# Patient Record
Sex: FEMALE | Race: WHITE | NOT HISPANIC OR LATINO | Employment: OTHER | URBAN - METROPOLITAN AREA
[De-identification: names, ages, dates, MRNs, and addresses within clinical notes are randomized per-mention and may not be internally consistent; named-entity substitution may affect disease eponyms.]

---

## 2023-04-05 ENCOUNTER — LAB (OUTPATIENT)
Dept: LAB | Facility: LAB | Age: 73
End: 2023-04-05
Payer: MEDICARE

## 2023-04-05 ENCOUNTER — OFFICE VISIT (OUTPATIENT)
Dept: PRIMARY CARE | Facility: CLINIC | Age: 73
End: 2023-04-05
Payer: MEDICARE

## 2023-04-05 VITALS
HEART RATE: 73 BPM | OXYGEN SATURATION: 98 % | WEIGHT: 179 LBS | TEMPERATURE: 97.5 F | HEIGHT: 62 IN | BODY MASS INDEX: 32.94 KG/M2 | SYSTOLIC BLOOD PRESSURE: 132 MMHG | DIASTOLIC BLOOD PRESSURE: 73 MMHG

## 2023-04-05 DIAGNOSIS — E87.6 HYPOKALEMIA: ICD-10-CM

## 2023-04-05 DIAGNOSIS — S20.212D CONTUSION OF RIB ON LEFT SIDE, SUBSEQUENT ENCOUNTER: ICD-10-CM

## 2023-04-05 DIAGNOSIS — I10 HYPERTENSION, UNSPECIFIED TYPE: Primary | ICD-10-CM

## 2023-04-05 DIAGNOSIS — S92.414A CLOSED NONDISPLACED FRACTURE OF PROXIMAL PHALANX OF RIGHT GREAT TOE, INITIAL ENCOUNTER: ICD-10-CM

## 2023-04-05 DIAGNOSIS — E66.1 CLASS 1 DRUG-INDUCED OBESITY WITH SERIOUS COMORBIDITY AND BODY MASS INDEX (BMI) OF 33.0 TO 33.9 IN ADULT: ICD-10-CM

## 2023-04-05 DIAGNOSIS — R10.9 ABDOMINAL DISCOMFORT: ICD-10-CM

## 2023-04-05 DIAGNOSIS — E78.5 HYPERLIPIDEMIA, UNSPECIFIED HYPERLIPIDEMIA TYPE: ICD-10-CM

## 2023-04-05 DIAGNOSIS — F41.9 ANXIETY: ICD-10-CM

## 2023-04-05 DIAGNOSIS — M19.90 OSTEOARTHRITIS, UNSPECIFIED OSTEOARTHRITIS TYPE, UNSPECIFIED SITE: ICD-10-CM

## 2023-04-05 DIAGNOSIS — I10 HYPERTENSION, ESSENTIAL: ICD-10-CM

## 2023-04-05 DIAGNOSIS — E55.9 VITAMIN D DEFICIENCY: ICD-10-CM

## 2023-04-05 DIAGNOSIS — F39 MOOD DISORDER (CMS-HCC): ICD-10-CM

## 2023-04-05 DIAGNOSIS — R10.9 ABDOMINAL PAIN, UNSPECIFIED ABDOMINAL LOCATION: ICD-10-CM

## 2023-04-05 DIAGNOSIS — Z79.899 MEDICATION MANAGEMENT: ICD-10-CM

## 2023-04-05 PROBLEM — M25.571 ANKLE PAIN, RIGHT: Status: ACTIVE | Noted: 2023-04-05

## 2023-04-05 PROBLEM — U07.1 COVID-19 VIRUS INFECTION: Status: RESOLVED | Noted: 2023-04-05 | Resolved: 2023-04-05

## 2023-04-05 PROBLEM — K44.9 HIATAL HERNIA: Status: ACTIVE | Noted: 2023-04-05

## 2023-04-05 PROBLEM — U07.1 COVID-19 VIRUS INFECTION: Status: ACTIVE | Noted: 2023-04-05

## 2023-04-05 PROBLEM — E78.2 MIXED HYPERLIPIDEMIA: Status: ACTIVE | Noted: 2023-04-05

## 2023-04-05 PROBLEM — H69.90 ETD (EUSTACHIAN TUBE DYSFUNCTION): Status: ACTIVE | Noted: 2023-04-05

## 2023-04-05 PROBLEM — R23.2 HOT FLASHES: Status: ACTIVE | Noted: 2023-04-05

## 2023-04-05 PROBLEM — L60.8 CHANGE IN NAIL APPEARANCE: Status: RESOLVED | Noted: 2023-04-05 | Resolved: 2023-04-05

## 2023-04-05 PROBLEM — L60.8 CHANGE IN NAIL APPEARANCE: Status: ACTIVE | Noted: 2023-04-05

## 2023-04-05 PROBLEM — R41.3 COMPLAINT OF MEMORY DISORDER WITHOUT OBSERVED OBJECTIVE MEMORY DEFICIT: Status: ACTIVE | Noted: 2023-04-05

## 2023-04-05 PROBLEM — R39.9 UTI SYMPTOMS: Status: ACTIVE | Noted: 2023-04-05

## 2023-04-05 PROBLEM — R73.9 ELEVATED BLOOD SUGAR: Status: ACTIVE | Noted: 2023-04-05

## 2023-04-05 PROBLEM — H81.10 BPV (BENIGN POSITIONAL VERTIGO): Status: ACTIVE | Noted: 2023-04-05

## 2023-04-05 PROBLEM — R05.9 COUGH: Status: ACTIVE | Noted: 2023-04-05

## 2023-04-05 PROBLEM — M79.676 TOE PAIN: Status: ACTIVE | Noted: 2023-04-05

## 2023-04-05 PROBLEM — R31.9 HEMATURIA: Status: ACTIVE | Noted: 2023-04-05

## 2023-04-05 PROBLEM — K21.9 GERD (GASTROESOPHAGEAL REFLUX DISEASE): Status: ACTIVE | Noted: 2023-04-05

## 2023-04-05 PROBLEM — J45.909 REACTIVE AIRWAY DISEASE (HHS-HCC): Status: ACTIVE | Noted: 2023-04-05

## 2023-04-05 PROBLEM — M89.9 DISORDER OF RIB: Status: ACTIVE | Noted: 2023-04-05

## 2023-04-05 PROBLEM — G89.29 CHRONIC BILATERAL LOW BACK PAIN WITHOUT SCIATICA: Status: ACTIVE | Noted: 2023-04-05

## 2023-04-05 PROBLEM — R05.9 COUGH: Status: RESOLVED | Noted: 2023-04-05 | Resolved: 2023-04-05

## 2023-04-05 PROBLEM — R39.9 UTI SYMPTOMS: Status: RESOLVED | Noted: 2023-04-05 | Resolved: 2023-04-05

## 2023-04-05 PROBLEM — J02.9 SORE THROAT: Status: RESOLVED | Noted: 2023-04-05 | Resolved: 2023-04-05

## 2023-04-05 PROBLEM — G62.9 NEUROPATHY: Status: ACTIVE | Noted: 2023-04-05

## 2023-04-05 PROBLEM — J30.9 ALLERGIC RHINITIS: Status: ACTIVE | Noted: 2023-04-05

## 2023-04-05 PROBLEM — G47.33 OBSTRUCTIVE SLEEP APNEA: Status: ACTIVE | Noted: 2023-04-05

## 2023-04-05 PROBLEM — B36.9 FUNGAL DERMATITIS: Status: RESOLVED | Noted: 2023-04-05 | Resolved: 2023-04-05

## 2023-04-05 PROBLEM — M79.671 RIGHT FOOT PAIN: Status: ACTIVE | Noted: 2023-04-05

## 2023-04-05 PROBLEM — R41.3 IMPAIRED MEMORY: Status: ACTIVE | Noted: 2023-04-05

## 2023-04-05 PROBLEM — M54.50 CHRONIC BILATERAL LOW BACK PAIN WITHOUT SCIATICA: Status: ACTIVE | Noted: 2023-04-05

## 2023-04-05 PROBLEM — G47.419 NARCOLEPSY WITHOUT CATAPLEXY (HHS-HCC): Status: ACTIVE | Noted: 2023-04-05

## 2023-04-05 PROBLEM — R53.83 FATIGUE: Status: ACTIVE | Noted: 2023-04-05

## 2023-04-05 PROBLEM — J02.9 SORE THROAT: Status: ACTIVE | Noted: 2023-04-05

## 2023-04-05 PROBLEM — B36.9 FUNGAL DERMATITIS: Status: ACTIVE | Noted: 2023-04-05

## 2023-04-05 PROBLEM — M26.649 TMJ ARTHRITIS: Status: ACTIVE | Noted: 2023-04-05

## 2023-04-05 LAB — URATE (MG/DL) IN SER/PLAS: 3.8 MG/DL (ref 2.3–6.7)

## 2023-04-05 PROCEDURE — 36415 COLL VENOUS BLD VENIPUNCTURE: CPT

## 2023-04-05 PROCEDURE — 80358 DRUG SCREENING METHADONE: CPT

## 2023-04-05 PROCEDURE — 3078F DIAST BP <80 MM HG: CPT | Performed by: INTERNAL MEDICINE

## 2023-04-05 PROCEDURE — 1036F TOBACCO NON-USER: CPT | Performed by: INTERNAL MEDICINE

## 2023-04-05 PROCEDURE — 80361 OPIATES 1 OR MORE: CPT

## 2023-04-05 PROCEDURE — 1159F MED LIST DOCD IN RCRD: CPT | Performed by: INTERNAL MEDICINE

## 2023-04-05 PROCEDURE — 80365 DRUG SCREENING OXYCODONE: CPT

## 2023-04-05 PROCEDURE — 80346 BENZODIAZEPINES1-12: CPT

## 2023-04-05 PROCEDURE — 84550 ASSAY OF BLOOD/URIC ACID: CPT

## 2023-04-05 PROCEDURE — 3008F BODY MASS INDEX DOCD: CPT | Performed by: INTERNAL MEDICINE

## 2023-04-05 PROCEDURE — 99214 OFFICE O/P EST MOD 30 MIN: CPT | Performed by: INTERNAL MEDICINE

## 2023-04-05 PROCEDURE — 80354 DRUG SCREENING FENTANYL: CPT

## 2023-04-05 PROCEDURE — 3075F SYST BP GE 130 - 139MM HG: CPT | Performed by: INTERNAL MEDICINE

## 2023-04-05 PROCEDURE — 80373 DRUG SCREENING TRAMADOL: CPT

## 2023-04-05 PROCEDURE — 80368 SEDATIVE HYPNOTICS: CPT

## 2023-04-05 PROCEDURE — 80307 DRUG TEST PRSMV CHEM ANLYZR: CPT

## 2023-04-05 RX ORDER — GLUCOSAM/CHONDRO/HERB 149/HYAL 750-100 MG
1 TABLET ORAL DAILY
COMMUNITY

## 2023-04-05 RX ORDER — BUSPIRONE HYDROCHLORIDE 10 MG/1
5 TABLET ORAL DAILY
COMMUNITY
Start: 2021-10-15 | End: 2023-09-01 | Stop reason: ALTCHOICE

## 2023-04-05 RX ORDER — CHLORHEXIDINE GLUCONATE ORAL RINSE 1.2 MG/ML
SOLUTION DENTAL
COMMUNITY

## 2023-04-05 RX ORDER — POLYETHYLENE GLYCOL 3350 17 G/17G
POWDER, FOR SOLUTION ORAL AS NEEDED
COMMUNITY

## 2023-04-05 RX ORDER — MULTIVIT-MIN/IRON/FOLIC ACID/K 18-600-40
1 CAPSULE ORAL DAILY
COMMUNITY
Start: 2018-08-17

## 2023-04-05 RX ORDER — TURMERIC ROOT EXTRACT 500 MG
1 TABLET ORAL DAILY
COMMUNITY

## 2023-04-05 RX ORDER — BIOTIN 1 MG
TABLET ORAL
COMMUNITY

## 2023-04-05 RX ORDER — OMEPRAZOLE 20 MG/1
20 CAPSULE, DELAYED RELEASE ORAL DAILY
COMMUNITY
Start: 2020-02-21 | End: 2023-09-05 | Stop reason: SDUPTHER

## 2023-04-05 RX ORDER — DULOXETIN HYDROCHLORIDE 30 MG/1
1 CAPSULE, DELAYED RELEASE ORAL DAILY
COMMUNITY
Start: 2018-08-17 | End: 2023-08-11 | Stop reason: SDUPTHER

## 2023-04-05 RX ORDER — PRAVASTATIN SODIUM 20 MG/1
1 TABLET ORAL NIGHTLY
COMMUNITY
Start: 2018-08-17 | End: 2023-08-11 | Stop reason: SDUPTHER

## 2023-04-05 RX ORDER — ALPRAZOLAM 0.25 MG/1
0.25 TABLET ORAL 2 TIMES DAILY PRN
Qty: 60 TABLET | Refills: 1 | Status: SHIPPED | OUTPATIENT
Start: 2023-04-05 | End: 2023-09-01 | Stop reason: SDUPTHER

## 2023-04-05 RX ORDER — AMOXICILLIN AND CLAVULANATE POTASSIUM 875; 125 MG/1; MG/1
875 TABLET, FILM COATED ORAL 2 TIMES DAILY
Qty: 20 TABLET | Refills: 0 | Status: SHIPPED | OUTPATIENT
Start: 2023-04-05 | End: 2023-04-15

## 2023-04-05 RX ORDER — LOSARTAN POTASSIUM 100 MG/1
1 TABLET ORAL DAILY
COMMUNITY
Start: 2018-07-30 | End: 2023-09-05 | Stop reason: SDUPTHER

## 2023-04-05 RX ORDER — HYDROCHLOROTHIAZIDE 12.5 MG/1
1 CAPSULE ORAL DAILY
COMMUNITY
Start: 2018-08-17 | End: 2023-06-19 | Stop reason: SDUPTHER

## 2023-04-05 RX ORDER — ALBUTEROL SULFATE 90 UG/1
2 AEROSOL, METERED RESPIRATORY (INHALATION) EVERY 4 HOURS PRN
COMMUNITY
Start: 2018-08-17

## 2023-04-05 RX ORDER — FLUTICASONE PROPIONATE 50 MCG
2 SPRAY, SUSPENSION (ML) NASAL DAILY
COMMUNITY
Start: 2018-08-17 | End: 2023-09-25 | Stop reason: SDUPTHER

## 2023-04-05 RX ORDER — SUMATRIPTAN 50 MG/1
TABLET, FILM COATED ORAL
COMMUNITY
Start: 2018-08-17 | End: 2023-06-30 | Stop reason: SDUPTHER

## 2023-04-05 RX ORDER — ALPRAZOLAM 0.25 MG/1
0.25 TABLET ORAL 2 TIMES DAILY PRN
COMMUNITY
Start: 2022-01-31 | End: 2023-04-05 | Stop reason: SDUPTHER

## 2023-04-05 RX ORDER — FERROUS SULFATE, DRIED 160(50) MG
2 TABLET, EXTENDED RELEASE ORAL DAILY
COMMUNITY

## 2023-04-05 RX ORDER — AMLODIPINE BESYLATE 2.5 MG/1
1 TABLET ORAL DAILY
COMMUNITY
Start: 2021-10-29 | End: 2023-10-25 | Stop reason: SDUPTHER

## 2023-04-05 RX ORDER — GABAPENTIN 100 MG/1
2 CAPSULE ORAL SEE ADMIN INSTRUCTIONS
COMMUNITY
Start: 2018-08-17 | End: 2023-09-05 | Stop reason: SDUPTHER

## 2023-04-05 RX ORDER — LORATADINE 10 MG/1
1 TABLET ORAL DAILY PRN
COMMUNITY

## 2023-04-05 RX ORDER — PERPHENAZINE/AMITRIPTYLINE HCL 2 MG-10 MG
1 TABLET ORAL DAILY
COMMUNITY
Start: 2018-08-17

## 2023-04-05 ASSESSMENT — PATIENT HEALTH QUESTIONNAIRE - PHQ9
SUM OF ALL RESPONSES TO PHQ9 QUESTIONS 1 AND 2: 1
10. IF YOU CHECKED OFF ANY PROBLEMS, HOW DIFFICULT HAVE THESE PROBLEMS MADE IT FOR YOU TO DO YOUR WORK, TAKE CARE OF THINGS AT HOME, OR GET ALONG WITH OTHER PEOPLE: NOT DIFFICULT AT ALL
2. FEELING DOWN, DEPRESSED OR HOPELESS: SEVERAL DAYS
10. IF YOU CHECKED OFF ANY PROBLEMS, HOW DIFFICULT HAVE THESE PROBLEMS MADE IT FOR YOU TO DO YOUR WORK, TAKE CARE OF THINGS AT HOME, OR GET ALONG WITH OTHER PEOPLE: NOT DIFFICULT AT ALL
SUM OF ALL RESPONSES TO PHQ9 QUESTIONS 1 AND 2: 1
2. FEELING DOWN, DEPRESSED OR HOPELESS: SEVERAL DAYS
1. LITTLE INTEREST OR PLEASURE IN DOING THINGS: NOT AT ALL
1. LITTLE INTEREST OR PLEASURE IN DOING THINGS: NOT AT ALL

## 2023-04-05 ASSESSMENT — ENCOUNTER SYMPTOMS
LOSS OF SENSATION IN FEET: 0
DEPRESSION: 0
OCCASIONAL FEELINGS OF UNSTEADINESS: 0

## 2023-04-05 NOTE — PROGRESS NOTES
ASSESSMENT/PLAN  Problem List Items Addressed This Visit          Nervous    Abdominal discomfort       Circulatory    Hypertension, essential    Overview     2/17/21: Continue current medications.            Musculoskeletal    Osteoarthritis       Endocrine/Metabolic    Vitamin D deficiency       Other    Anxiety    Relevant Medications    ALPRAZolam (Xanax) 0.25 mg tablet    Hypokalemia    Mood disorder (CMS/HCC)    Overview     Stable condition. Follow up at least yearly. Continue current medications.  2/1/22: inc buspar.          Other Visit Diagnoses       Hypertension, unspecified type    -  Primary    Hyperlipidemia, unspecified hyperlipidemia type        Class 1 drug-induced obesity with serious comorbidity and body mass index (BMI) of 33.0 to 33.9 in adult        stable cont meds fu at least yearly    Closed nondisplaced fracture of proximal phalanx of right great toe, initial encounter        Relevant Medications    amoxicillin-pot clavulanate (Augmentin) 875-125 mg tablet    Other Relevant Orders    XR toes right 2+ views    Uric acid    Abdominal pain, unspecified abdominal location        Relevant Orders    US abdomen    Contusion of rib on left side, subsequent encounter        Relevant Orders    XR ribs left 2 views              Chief Complaint/HPI: 6 month follow up  Toe pain  Fell 3 weeks ago   Hit head, broke toe  Knee and r elbow  Left hand      ROS otherwise negative aside from what was mentioned above in HPI.  Gen:  no fever  HEENT:  no trouble swallowing  CV:  no dyspnea, cyanosis  Lungs:  no shortness of breath  GI:  no constipation, no blood in stool  Vascular:  no edema  Neuro:   no weakness  Skin:  no rash  MS: pos  joint swelling  Gu:  no urinary complaints  All other systems have been reviewed and are negative for complaint      Patient Active Problem List   Diagnosis    Abdominal discomfort    Allergic rhinitis    Ankle pain, right    Anxiety    BPV (benign positional vertigo)    Chronic  bilateral low back pain without sciatica    Complaint of memory disorder without observed objective memory deficit    Fatigue    Disorder of rib    Elevated blood sugar    Hypertension, essential    ETD (eustachian tube dysfunction)    GERD (gastroesophageal reflux disease)    Hiatal hernia    Hematuria    Hot flashes    Hypokalemia    Impaired memory    Mixed hyperlipidemia    Mood disorder (CMS/HCC)    Narcolepsy without cataplexy    Neuropathy    Osteoarthritis    Obstructive sleep apnea    Reactive airway disease    Right foot pain    Toe pain    TMJ arthritis    Vitamin D deficiency       ALLERGIES  Atorvastatin, Pregabalin, and Statins-hmg-coa reductase inhibitors    MEDICATIONS  Current Outpatient Medications   Medication Instructions    albuterol 90 mcg/actuation inhaler 2 puffs, inhalation, Every 4 hours PRN    ALPRAZolam (XANAX) 0.25 mg, oral, 2 times daily PRN    amLODIPine (Norvasc) 2.5 mg tablet 1 tablet, oral, Daily    amoxicillin-pot clavulanate (Augmentin) 875-125 mg tablet 875 mg, oral, 2 times daily    ascorbic acid, vitamin C, 500 mg capsule 1 capsule, oral, Daily    biotin 1 mg tablet Take as directed    busPIRone (BUSPAR) 10 mg, oral, 2 times daily    calcium carbonate-vitamin D3 500 mg-5 mcg (200 unit) tablet 2 tablets, oral, Daily    chlorhexidine (Peridex) 0.12 % solution RINSE MOUTH WITH 15 ML (1 CAPFUL) FOR 30 SECONDS AM AND PM AFTER TOOTHBRUSHING. EXPECTORATE AFTER RINSING. DO NOT SWALLOW PRN.    cranberry conc-ascorbic acid 12,600-20 mg capsule 1 capsule, oral, Daily    DULoxetine (Cymbalta) 30 mg DR capsule 1 capsule, oral, Daily    fluticasone (Flonase) 50 mcg/actuation nasal spray 2 sprays, Each Nostril, Daily    gabapentin (Neurontin) 100 mg capsule 2 capsules, oral, 2 times daily    hydroCHLOROthiazide (Microzide) 12.5 mg capsule 1 capsule, oral, Daily    L. acidophilus/Bifid. animalis (DAILY PROBIOTIC ORAL) Use as directed    loratadine (Claritin) 10 mg tablet 1 tablet, oral, Daily  "PRN    losartan (Cozaar) 100 mg tablet 1 tablet, oral, Daily    NON FORMULARY Vitamin D 50 MCG (2000 UT) Oral Capsule; TAKE 2 CAPSULES Daily    omega 3-dha-epa-fish oil 1,000 mg (120 mg-180 mg) capsule 1 capsule, oral, Daily    omeprazole (PRILOSEC) 20 mg, oral, 2 times daily    parsley/garlic (GARLIC-PARSLEY ORAL) Take as directed    polyethylene glycol (Glycolax) 17 gram/dose powder MIX 1 PACKET IN 8 OUNCES OF LIQUID AND DRINK EOD    pravastatin (Pravachol) 20 mg tablet 1 tablet, oral, Nightly    SUMAtriptan (Imitrex) 50 mg tablet TAKE 1 TABLET AT ONSET OF HEADACHE. CAN TAKE ANOTHER DOSE IN 2 HOURS IF HEADACHE CONTINUES.    turmeric root extract 500 mg tablet 1 tablet, oral, Daily        PHYSICAL EXAM  /73   Pulse 73   Temp 36.4 °C (97.5 °F)   Ht 1.562 m (5' 1.5\")   Wt 81.2 kg (179 lb)   SpO2 98%   BMI 33.27 kg/m²   Body mass index is 33.27 kg/m².    General Appearance:  Alert and oriented.  NAD  Lungs, CTAB  Skin:  no suspicious lesions,  warm and dry  Head :  Normocephalic  Neck/thyroid:  neck supple, full rom, no cervical lymphadenopathy  no thyromegaly  Heart:  RRR  no murmurs  Abdomen:  Normal , bs present, soft, tender over epigastric area and lower rib, not distended, no masses palpated  Extremities:  No clubbing, cyanosis, or edema  Neurologic:  Nonfocal  Psych: alert, normal mood  R great toe mild redness, swelling  Broken bv in left hand    Aileen was seen today for follow-up.  Diagnoses and all orders for this visit:  Hypertension, unspecified type (Primary)  Vitamin D deficiency  Hypokalemia  Mood disorder (CMS/HCC)  Comments:  stable cont meds     fu at least yearly  Hyperlipidemia, unspecified hyperlipidemia type  Class 1 drug-induced obesity with serious comorbidity and body mass index (BMI) of 33.0 to 33.9 in adult  Comments:  stable cont meds fu at least yearly  Closed nondisplaced fracture of proximal phalanx of right great toe, initial encounter  -     XR toes right 2+ views; " Future  -     Uric acid; Future  -     amoxicillin-pot clavulanate (Augmentin) 875-125 mg tablet; Take 1 tablet (875 mg) by mouth in the morning and 1 tablet (875 mg) before bedtime. Do all this for 10 days.  Abdominal pain, unspecified abdominal location  -     US abdomen; Future  Contusion of rib on left side, subsequent encounter  -     XR ribs left 2 views; Future  Anxiety  -     ALPRAZolam (Xanax) 0.25 mg tablet; Take 1 tablet (0.25 mg) by mouth 2 times a day as needed for anxiety.  Osteoarthritis, unspecified osteoarthritis type, unspecified site  Hypertension, essential  Abdominal discomfort    Chronic conditions reviewed in the assessment and plan.    Continue medications unless specified otherwise.  Previous labs reviewed. Ccf  lipids done as well   Ldl 108  Other specialty provider notes reviewed.

## 2023-04-13 LAB
6-ACETYLMORPHINE: <25 NG/ML
7-AMINOCLONAZEPAM: <25 NG/ML
ALPHA-HYDROXYALPRAZOLAM: 37 NG/ML
ALPHA-HYDROXYMIDAZOLAM: <25 NG/ML
ALPRAZOLAM: <25 NG/ML
AMPHETAMINE (PRESENCE) IN URINE BY SCREEN METHOD: ABNORMAL
BARBITURATES PRESENCE IN URINE BY SCREEN METHOD: ABNORMAL
CANNABINOIDS IN URINE BY SCREEN METHOD: ABNORMAL
CHLORDIAZEPOXIDE: <25 NG/ML
CLONAZEPAM: <25 NG/ML
COCAINE (PRESENCE) IN URINE BY SCREEN METHOD: ABNORMAL
CODEINE: <50 NG/ML
CREATINE, URINE FOR DRUG: 38 MG/DL
DIAZEPAM: <25 NG/ML
DRUG SCREEN COMMENT URINE: ABNORMAL
EDDP: <25 NG/ML
FENTANYL CONFIRMATION, URINE: <2.5 NG/ML
HYDROCODONE: <25 NG/ML
HYDROMORPHONE: <25 NG/ML
LORAZEPAM: <25 NG/ML
METHADONE CONFIRMATION,URINE: <25 NG/ML
MIDAZOLAM: <25 NG/ML
MORPHINE URINE: <50 NG/ML
NORDIAZEPAM: <25 NG/ML
NORFENTANYL: <2.5 NG/ML
NORHYDROCODONE: <25 NG/ML
NOROXYCODONE: <25 NG/ML
O-DESMETHYLTRAMADOL: <50 NG/ML
OXAZEPAM: <25 NG/ML
OXYCODONE: <25 NG/ML
OXYMORPHONE: <25 NG/ML
PHENCYCLIDINE (PRESENCE) IN URINE BY SCREEN METHOD: ABNORMAL
TEMAZEPAM: <25 NG/ML
TRAMADOL: <50 NG/ML
ZOLPIDEM METABOLITE (ZCA): <25 NG/ML
ZOLPIDEM: <25 NG/ML

## 2023-06-19 DIAGNOSIS — I10 HYPERTENSION, ESSENTIAL: ICD-10-CM

## 2023-06-20 RX ORDER — HYDROCHLOROTHIAZIDE 12.5 MG/1
12.5 CAPSULE ORAL DAILY
Qty: 90 CAPSULE | Refills: 3 | Status: SHIPPED | OUTPATIENT
Start: 2023-06-20 | End: 2024-03-06 | Stop reason: DRUGHIGH

## 2023-06-30 DIAGNOSIS — G43.809 OTHER MIGRAINE WITHOUT STATUS MIGRAINOSUS, NOT INTRACTABLE: ICD-10-CM

## 2023-06-30 RX ORDER — SUMATRIPTAN 50 MG/1
50 TABLET, FILM COATED ORAL ONCE AS NEEDED
Qty: 9 TABLET | Refills: 3 | Status: SHIPPED | OUTPATIENT
Start: 2023-06-30 | End: 2024-04-05

## 2023-08-11 DIAGNOSIS — E78.2 MIXED HYPERLIPIDEMIA: ICD-10-CM

## 2023-08-11 DIAGNOSIS — F39 MOOD DISORDER (CMS-HCC): ICD-10-CM

## 2023-08-11 RX ORDER — PRAVASTATIN SODIUM 20 MG/1
20 TABLET ORAL NIGHTLY
Qty: 90 TABLET | Refills: 3 | Status: SHIPPED | OUTPATIENT
Start: 2023-08-11 | End: 2024-01-29 | Stop reason: SDUPTHER

## 2023-08-11 RX ORDER — DULOXETIN HYDROCHLORIDE 30 MG/1
30 CAPSULE, DELAYED RELEASE ORAL DAILY
Qty: 90 CAPSULE | Refills: 3 | Status: SHIPPED | OUTPATIENT
Start: 2023-08-11 | End: 2024-01-29 | Stop reason: SDUPTHER

## 2023-09-01 ENCOUNTER — OFFICE VISIT (OUTPATIENT)
Dept: PRIMARY CARE | Facility: CLINIC | Age: 73
End: 2023-09-01
Payer: MEDICARE

## 2023-09-01 VITALS
TEMPERATURE: 97.3 F | SYSTOLIC BLOOD PRESSURE: 130 MMHG | HEART RATE: 80 BPM | WEIGHT: 182 LBS | DIASTOLIC BLOOD PRESSURE: 80 MMHG | BODY MASS INDEX: 35.73 KG/M2 | HEIGHT: 60 IN | OXYGEN SATURATION: 97 %

## 2023-09-01 DIAGNOSIS — I10 HYPERTENSION, ESSENTIAL: ICD-10-CM

## 2023-09-01 DIAGNOSIS — G44.009 CLUSTER HEADACHE, NOT INTRACTABLE, UNSPECIFIED CHRONICITY PATTERN: ICD-10-CM

## 2023-09-01 DIAGNOSIS — G43.809 OTHER MIGRAINE WITHOUT STATUS MIGRAINOSUS, NOT INTRACTABLE: ICD-10-CM

## 2023-09-01 DIAGNOSIS — E55.9 VITAMIN D DEFICIENCY: ICD-10-CM

## 2023-09-01 DIAGNOSIS — F41.9 ANXIETY: ICD-10-CM

## 2023-09-01 DIAGNOSIS — Z00.00 WELL ADULT EXAM: Primary | ICD-10-CM

## 2023-09-01 DIAGNOSIS — F39 MOOD DISORDER (CMS-HCC): ICD-10-CM

## 2023-09-01 DIAGNOSIS — E66.01 CLASS 2 SEVERE OBESITY DUE TO EXCESS CALORIES WITH SERIOUS COMORBIDITY AND BODY MASS INDEX (BMI) OF 35.0 TO 35.9 IN ADULT (MULTI): ICD-10-CM

## 2023-09-01 DIAGNOSIS — K21.9 GASTROESOPHAGEAL REFLUX DISEASE WITHOUT ESOPHAGITIS: ICD-10-CM

## 2023-09-01 DIAGNOSIS — R73.9 ELEVATED BLOOD SUGAR: ICD-10-CM

## 2023-09-01 DIAGNOSIS — E78.2 MIXED HYPERLIPIDEMIA: ICD-10-CM

## 2023-09-01 PROBLEM — M79.676 TOE PAIN: Status: RESOLVED | Noted: 2023-04-05 | Resolved: 2023-09-01

## 2023-09-01 PROBLEM — M89.9 DISORDER OF RIB: Status: RESOLVED | Noted: 2023-04-05 | Resolved: 2023-09-01

## 2023-09-01 PROBLEM — M79.671 RIGHT FOOT PAIN: Status: RESOLVED | Noted: 2023-04-05 | Resolved: 2023-09-01

## 2023-09-01 PROCEDURE — 99214 OFFICE O/P EST MOD 30 MIN: CPT | Performed by: INTERNAL MEDICINE

## 2023-09-01 PROCEDURE — 3079F DIAST BP 80-89 MM HG: CPT | Performed by: INTERNAL MEDICINE

## 2023-09-01 PROCEDURE — G0439 PPPS, SUBSEQ VISIT: HCPCS | Performed by: INTERNAL MEDICINE

## 2023-09-01 PROCEDURE — 1159F MED LIST DOCD IN RCRD: CPT | Performed by: INTERNAL MEDICINE

## 2023-09-01 PROCEDURE — 93000 ELECTROCARDIOGRAM COMPLETE: CPT | Performed by: INTERNAL MEDICINE

## 2023-09-01 PROCEDURE — 1036F TOBACCO NON-USER: CPT | Performed by: INTERNAL MEDICINE

## 2023-09-01 PROCEDURE — 3008F BODY MASS INDEX DOCD: CPT | Performed by: INTERNAL MEDICINE

## 2023-09-01 PROCEDURE — 3075F SYST BP GE 130 - 139MM HG: CPT | Performed by: INTERNAL MEDICINE

## 2023-09-01 PROCEDURE — 1160F RVW MEDS BY RX/DR IN RCRD: CPT | Performed by: INTERNAL MEDICINE

## 2023-09-01 RX ORDER — ALPRAZOLAM 0.25 MG/1
0.25 TABLET ORAL 2 TIMES DAILY PRN
Qty: 60 TABLET | Refills: 2 | Status: SHIPPED | OUTPATIENT
Start: 2023-09-01 | End: 2023-12-06 | Stop reason: SDUPTHER

## 2023-09-01 RX ORDER — CELECOXIB 100 MG/1
100 CAPSULE ORAL AS NEEDED
COMMUNITY

## 2023-09-01 ASSESSMENT — ANXIETY QUESTIONNAIRES
IF YOU CHECKED OFF ANY PROBLEMS ON THIS QUESTIONNAIRE, HOW DIFFICULT HAVE THESE PROBLEMS MADE IT FOR YOU TO DO YOUR WORK, TAKE CARE OF THINGS AT HOME, OR GET ALONG WITH OTHER PEOPLE: NOT DIFFICULT AT ALL
6. BECOMING EASILY ANNOYED OR IRRITABLE: SEVERAL DAYS
3. WORRYING TOO MUCH ABOUT DIFFERENT THINGS: NEARLY EVERY DAY
5. BEING SO RESTLESS THAT IT IS HARD TO SIT STILL: NOT AT ALL
2. NOT BEING ABLE TO STOP OR CONTROL WORRYING: SEVERAL DAYS
1. FEELING NERVOUS, ANXIOUS, OR ON EDGE: NEARLY EVERY DAY
7. FEELING AFRAID AS IF SOMETHING AWFUL MIGHT HAPPEN: NOT AT ALL
GAD7 TOTAL SCORE: 8
4. TROUBLE RELAXING: NOT AT ALL

## 2023-09-01 ASSESSMENT — PATIENT HEALTH QUESTIONNAIRE - PHQ9
2. FEELING DOWN, DEPRESSED OR HOPELESS: NOT AT ALL
1. LITTLE INTEREST OR PLEASURE IN DOING THINGS: NOT AT ALL
SUM OF ALL RESPONSES TO PHQ9 QUESTIONS 1 AND 2: 0

## 2023-09-01 NOTE — PROGRESS NOTES
Chief Complaint:   Medicare Wellness Exam/Comprehensive Problem Focused Follow Up and Physical Exam    HPI:  Anxiety up    No cp no sob   with cancer  Mood off a couple months ago  Dec to 5 mg of buspar felt better  Weaned buspar  To one per day  Ortho dr ho concepcion for elbow  R toe healed, but not perfect  Does pt  Hemp cream elbow  Broken toe  Still swolllen   Did not see ortho  Cluster ha  Done  Imitrex helped  Fell and broke toe in sej[place]          Patient Care Team:  Luz Maria Allen MD as PCP - General  [facility]uz [facility]   Active Problem List  Patient Active Problem List   Diagnosis    Abdominal discomfort    Allergic rhinitis    Ankle pain, right    Anxiety    BPV (benign positional vertigo)    Chronic bilateral low back pain without sciatica    Complaint of memory disorder without observed objective memory deficit    Fatigue    Elevated blood sugar    Hypertension, essential    ETD (eustachian tube dysfunction)    GERD (gastroesophageal reflux disease)    Hiatal hernia    Hematuria    Hot flashes    Hypokalemia    Impaired memory    Mixed hyperlipidemia    Mood disorder (CMS/HCC)    Narcolepsy without cataplexy    Neuropathy    Osteoarthritis    Obstructive sleep apnea    Reactive airway disease    TMJ arthritis    Vitamin D deficiency         Comprehensive Medical/Surgical/Social/Family History  Past Medical History:   Diagnosis Date    Body mass index (BMI) 35.0-35.9, adult 10/15/2020    Body mass index (BMI) of 35.0 to 35.9 in adult    Migraine, unspecified, not intractable, without status migrainosus     Migraine    Other postherpetic nervous system involvement     PHN (postherpetic neuralgia)    Other symptoms and signs involving the nervous system 10/14/2019    Suspected sleep apnea    Personal history of other diseases of the circulatory system     History of varicose veins of lower extremity    Personal history of other diseases of the  circulatory system     History of hypertension    Personal history of other diseases of the digestive system     History of gastroesophageal reflux (GERD)    Personal history of other diseases of the musculoskeletal system and connective tissue     History of fibromyalgia    Personal history of other diseases of the musculoskeletal system and connective tissue     History of osteopenia    Personal history of other diseases of the respiratory system     History of asthma    Personal history of other endocrine, nutritional and metabolic disease     History of hyperlipidemia    Personal history of other mental and behavioral disorders     History of depression     Past Surgical History:   Procedure Laterality Date    CHOLECYSTECTOMY  08/17/2018    Cholecystectomy    HERNIA REPAIR  08/17/2018    Inguinal Hernia Repair    HYSTERECTOMY  08/17/2018    Hysterectomy    OTHER SURGICAL HISTORY  08/17/2018    Hysteroscopy With Resection For Intrauterine Polyp Removal    OTHER SURGICAL HISTORY  11/17/2020    Hernia repair    OTHER SURGICAL HISTORY  11/17/2020    Lower back surgery    OTHER SURGICAL HISTORY  10/11/2019    Colonoscopy     Social History     Social History Narrative    Not on file     Tobacco/Alcohol/Opioid use, as well as Illicit Drug Use was screened for/reviewed and documented in Social Documentation section of the chart and medication list as appropriate    Allergies and Medications  Atorvastatin, Pregabalin, and Statins-hmg-coa reductase inhibitors  Current Outpatient Medications   Medication Instructions    albuterol 90 mcg/actuation inhaler 2 puffs, inhalation, Every 4 hours PRN    ALPRAZolam (XANAX) 0.25 mg, oral, 2 times daily PRN    amLODIPine (Norvasc) 2.5 mg tablet 1 tablet, oral, Daily    ascorbic acid, vitamin C, 500 mg capsule 1 capsule, oral, Daily    biotin 1 mg tablet Take as directed    calcium carbonate-vitamin D3 500 mg-5 mcg (200 unit) tablet 2 tablets, oral, Daily    celecoxib (CELEBREX) 100  mg, oral, Daily    chlorhexidine (Peridex) 0.12 % solution RINSE MOUTH WITH 15 ML (1 CAPFUL) FOR 30 SECONDS AM AND PM AFTER TOOTHBRUSHING. EXPECTORATE AFTER RINSING. DO NOT SWALLOW PRN.    cranberry conc-ascorbic acid 12,600-20 mg capsule 1 capsule, oral, Daily    DULoxetine (CYMBALTA) 30 mg, oral, Daily    fluticasone (Flonase) 50 mcg/actuation nasal spray 2 sprays, Each Nostril, Daily    gabapentin (Neurontin) 100 mg capsule 2 capsules, oral, See admin instructions, 2 capsule in am and 1 capsule in pm    hydroCHLOROthiazide (MICROZIDE) 12.5 mg, oral, Daily    L. acidophilus/Bifid. animalis (DAILY PROBIOTIC ORAL) Use as directed    loratadine (Claritin) 10 mg tablet 1 tablet, oral, Daily PRN    losartan (Cozaar) 100 mg tablet 1 tablet, oral, Daily    NON FORMULARY Vitamin D 50 MCG (2000 UT) Oral Capsule; TAKE 2 CAPSULES Daily    omega 3-dha-epa-fish oil 1,000 mg (120 mg-180 mg) capsule 1 capsule, oral, Daily    omeprazole (PRILOSEC) 20 mg, oral, Daily    PAPAYA ENZYME ORAL 3 tablets, oral, Daily    parsley/garlic (GARLIC-PARSLEY ORAL) Take as directed    polyethylene glycol (Glycolax) 17 gram/dose powder MIX 1 PACKET IN 8 OUNCES OF LIQUID AND DRINK EOD    pravastatin (PRAVACHOL) 20 mg, oral, Nightly    SUMAtriptan (IMITREX) 50 mg, oral, Once as needed, TAKE 1 TABLET AT ONSET OF HEADACHE. CAN TAKE ANOTHER DOSE IN 2 HOURS IF HEADACHE CONTINUES.    turmeric root extract 500 mg tablet 1 tablet, oral, Daily     Medications and Supplements  prescribed by me and other practitioners or clinical pharmacist (such as prescriptions, OTC's, herbal therapies and supplements) were reviewed and documented in the medical record.      Activities of Daily Living  In your present state of health, do you have any difficulty performing the following activities?:   Preparing food and eating?: No  Bathing yourself: No  Getting dressed: No  Using the toilet:No  Moving around from place to place: No  In the past year have you fallen or had  "a near fall?:Yes  Able to manage finances independently: Yes  Able to perform grocery shopping: Yes  Able to manage medications independently: Yes  Able to do housework independently: Yes  Patient self-assessment of health status? Good    Depression Screen  (Note: if answer to either of the following is \"Yes\", then a more complete depression screening is indicated)   Q1: Over the past two weeks, have you felt down, depressed or hopeless? No  Q2: Over the past two weeks, have you felt little interest or pleasure in doing things? No    Current exercise habits: physical therapy   Dietary issues discussed: Yes  Hearing difficulties: Yes  Safe in current home environment: Yes  Visual Acuity assessed: No  Cognitive Impairment No  Problem List Items Addressed This Visit       Anxiety    Relevant Medications    ALPRAZolam (Xanax) 0.25 mg tablet    Other Relevant Orders    ECG 12 lead    Uric acid    Hemoglobin A1C    Comprehensive Metabolic Panel    TSH with reflex to Free T4 if abnormal    Vitamin D 25-Hydroxy,Total (for eval of Vitamin D levels)    CBC and Auto Differential    Lipid Panel    Elevated blood sugar    Relevant Orders    ECG 12 lead    Uric acid    Hemoglobin A1C    Comprehensive Metabolic Panel    TSH with reflex to Free T4 if abnormal    Vitamin D 25-Hydroxy,Total (for eval of Vitamin D levels)    CBC and Auto Differential    Lipid Panel    Hypertension, essential    Relevant Orders    ECG 12 lead    Uric acid    Hemoglobin A1C    Comprehensive Metabolic Panel    TSH with reflex to Free T4 if abnormal    Vitamin D 25-Hydroxy,Total (for eval of Vitamin D levels)    CBC and Auto Differential    Lipid Panel    CT cardiac scoring wo IV contrast    GERD (gastroesophageal reflux disease)    Relevant Orders    ECG 12 lead    Uric acid    Hemoglobin A1C    Comprehensive Metabolic Panel    TSH with reflex to Free T4 if abnormal    Vitamin D 25-Hydroxy,Total (for eval of Vitamin D levels)    CBC and Auto Differential "    Lipid Panel    Mixed hyperlipidemia    Relevant Orders    ECG 12 lead    Uric acid    Hemoglobin A1C    Comprehensive Metabolic Panel    TSH with reflex to Free T4 if abnormal    Vitamin D 25-Hydroxy,Total (for eval of Vitamin D levels)    CBC and Auto Differential    Lipid Panel    Mood disorder (CMS/[facility])    Relevant Orders    ECG 12 lead    Uric acid    Hemoglobin A1C    Comprehensive Metabolic Panel    TSH with reflex to Free T4 if abnormal    Vitamin D 25-Hydroxy,Total (for eval of Vitamin D levels)    CBC and Auto Differential    Lipid Panel    Vitamin D deficiency    Relevant Orders    ECG 12 lead    Uric acid    Hemoglobin A1C    Comprehensive Metabolic Panel    TSH with reflex to Free T4 if abnormal    Vitamin D 25-Hydroxy,Total (for eval of Vitamin D levels)    CBC and Auto Differential    Lipid Panel     Other Visit Diagnoses       Well adult exam    -  Primary    Relevant Orders    ECG 12 lead    Uric acid    Hemoglobin A1C    Comprehensive Metabolic Panel    TSH with reflex to Free T4 if abnormal    Vitamin D 25-Hydroxy,Total (for eval of Vitamin D levels)    CBC and Auto Differential    Lipid Panel    Class 2 severe obesity due to excess calories with serious comorbidity and body mass index (BMI) of 35.0 to 35.9 in adult (CMS/[facility])        Relevant Orders    ECG 12 lead    Uric acid    Hemoglobin A1C    Comprehensive Metabolic Panel    TSH with reflex to Free T4 if abnormal    Vitamin D 25-Hydroxy,Total (for eval of Vitamin D levels)    CBC and Auto Differential    Lipid Panel    Cluster headache, not intractable, unspecified chronicity pattern        Relevant Orders    ECG 12 lead    Uric acid    Hemoglobin A1C    Comprehensive Metabolic Panel    TSH with reflex to Free T4 if abnormal    Vitamin D 25-Hydroxy,Total (for eval of Vitamin D levels)    CBC and Auto Differential    Lipid Panel    Other migraine without status migrainosus, not intractable        discussed possible se of triptan  will  continue for now    Relevant Orders    ECG 12 lead    Uric acid    Hemoglobin A1C    Comprehensive Metabolic Panel    TSH with reflex to Free T4 if abnormal    Vitamin D 25-Hydroxy,Total (for eval of Vitamin D levels)    CBC and Auto Differential    Lipid Panel            Controlled Substance Visit:  I have personally reviewed the [place] report and have considered the risks of abuse, dependence, addiction and diversion and I believe that it is clinically appropriate for the patient to be prescribed this medication.    Is the patient prescribed a combination of a benzodiazepine and opioid?  Yes, I feel it is clincially indicated to continue the medication and have discussed with the patient risks/benefits/alternatives.    Last Urine Drug Screen / ordered today: No  Recent Results (from the past 8760 hour(s))   OPIATE/OPIOID/BENZO PRESCRIPTION COMPLIANCE    Collection Time: 04/05/23  2:31 PM   Result Value Ref Range    DRUG SCREEN COMMENT URINE SEE BELOW     Creatine, Urine 38.0 mg/dL    Amphetamine Screen, Urine PRESUMPTIVE NEGATIVE NEGATIVE    Barbiturate Screen, Urine PRESUMPTIVE NEGATIVE NEGATIVE    Cannabinoid Screen, Urine PRESUMPTIVE NEGATIVE NEGATIVE    Cocaine Screen, Urine PRESUMPTIVE NEGATIVE NEGATIVE    PCP Screen, Urine PRESUMPTIVE NEGATIVE NEGATIVE    7-Aminoclonazepam <25 Cutoff <25 ng/mL    Alpha-Hydroxyalprazolam 37 (A) Cutoff <25 ng/mL    Alpha-Hydroxymidazolam <25 Cutoff <25 ng/mL    Alprazolam <25 Cutoff <25 ng/mL    Chlordiazepoxide <25 Cutoff <25 ng/mL    Clonazepam <25 Cutoff <25 ng/mL    Diazepam <25 Cutoff <25 ng/mL    Lorazepam <25 Cutoff <25 ng/mL    Midazolam <25 Cutoff <25 ng/mL    Nordiazepam <25 Cutoff <25 ng/mL    Oxazepam <25 Cutoff <25 ng/mL    Temazepam <25 Cutoff <25 ng/mL    Zolpidem <25 Cutoff <25 ng/mL    Zolpidem Metabolite (ZCA) <25 Cutoff <25 ng/mL    6-Acetylmorphine <25 Cutoff <25 ng/mL    Codeine <50 Cutoff <50 ng/mL    Hydrocodone <25 Cutoff <25 ng/mL    Hydromorphone <25  Cutoff <25 ng/mL    Morphine Urine <50 Cutoff <50 ng/mL    Norhydrocodone <25 Cutoff <25 ng/mL    Noroxycodone <25 Cutoff <25 ng/mL    Oxycodone <25 Cutoff <25 ng/mL    Oxymorphone <25 Cutoff <25 ng/mL    Tramadol <50 Cutoff <50 ng/mL    O-Desmethyltramadol <50 Cutoff <50 ng/mL    Fentanyl <2.5 Cutoff<2.5 ng/mL    Norfentanyl <2.5 Cutoff<2.5 ng/mL    METHADONE CONFIRMATION,URINE <25 Cutoff <25 ng/mL    EDDP <25 Cutoff <25 ng/mL     Results are as expected.     Controlled Substance Agreement:  Date of the Last Agreement: 2023  I have reviewed each line item on the Controlled Substance Agreement including, but not limited to, the benefits, risks, and alternatives to treatment with a Controlled Substance medication(s). The patient has verbalized understanding and signed the agreement.    Benzodiazepines:  What is the patient's goal of therapy? Anxiety   Is this being achieved with current treatment? yes    LOC-7:  Over the last 2 weeks, how often have you been bothered by any of the following problems?  Feeling nervous, anxious, or on edge: 3  Not being able to stop or control worryin  Worrying too much about different things: 3  Trouble relaxin  Being so restless that it is hard to sit still: 0  Becoming easily annoyed or irritable: 1  Feeling afraid as if something awful might happen: 0  LOC-7 Total Score: 8        Activities of Daily Living:   Is your overall impression that this patient is benefiting (symptom reduction outweighs side effects) from benzodiazepine therapy? Yes     1. Physical Functioning: Better  2. Family Relationship: Same  3. Social Relationship: Same  4. Mood: Better  5. Sleep Patterns: Better  6. Overall Function: better      Advance directives  Advanced Care Planning (including a Living Will, Healthcare POA, as well as specific end of life choices and/or directives), was discussed with the patient and/or surrogate, voluntarily, and documented in the Problem List of the medical  record.     Cardiac Risk Assessment  Cardiovascular risk was discussed and, if needed, lifestyle modifications recommended, including nutritional choices, exercise, and elimination of habits contributing to risk. We agreed on a plan to reduce the current cardiovascular risk based on above discussion as needed.  Aspirin use/disuse was discussed and documented in the Problem List of the medical record after reviewing the updated guidelines below:    Consider low dose Aspirin ( mg) use if the benefit for cardiovascular disease prevention outweighs risk for bleeding complications.   In general, low dose ASA should be considered:  In patients WITHOUT prior MI/stroke/PAD (primary prevention):   a. Age <60: Use if 10-year cardiovascular disease risk >20%, with discussion of risks and benefits with patient  b. Age 60-<70: Use if 10-year cardiovascular disease risk >20% and low bleeding (e.g., gastrointenstinal) risk, with discussion of risks and benefits with patient  c. Age >=70: Do not use    In patients WITH prior MI/stroke/PAD (secondary prevention):   Generally use unless extremely high bleeding (e.g., gastrointenstinal) risk, with discussion of risks and benefits with patient        ROS otherwise negative aside from what was mentioned above in HPI.    Gen:  no fever  HEENT:  no trouble swallowing  CV:  no dyspnea, cyanosis  Lungs:  no shortness of breath  GI:  no constipation, no blood in stool  Vascular:  no edema  Neuro:   no weakness  Skin:  no rash  MS:no joint swelling toe bothering   Gu:  no urinary complaints  All other systems have been reviewed and are negative for complaint    Vitals  /80   Pulse 80   Temp 36.3 °C (97.3 °F) (Temporal)   Ht 1.524 m (5')   Wt 82.6 kg (182 lb)   SpO2 97%   BMI 35.54 kg/m²   Body mass index is 35.54 kg/m².  Objective   Physical Exam  General Appearance:  Alert and oriented.  NAD  HEENT:  Tm's normal , throat clear, no erythema  Lungs, CTAB  Skin:  no suspicious  lesions,  warm and dry  Head :  Normocephalic  Oral Cavity; Clear mucosa moist  Neck/thyroid:  neck supple, full rom, no cervical lymphadenopathy  no thyromegaly  Heart:  RRR  no murmurs  Abdomen:  Normal , bs present, soft, nontender, not distended, no masses palpated  Extremities:  No clubbing, cyanosis, or edema  Neurologic:  Nonfocal  Psych: alert, normal mood    During the course of the visit the patient was educated and counseled about age appropriate screening and preventive services. Completed preventive screenings were documented in the chart and orders were placed for outstanding screenings/procedures as documented in the Assessment and Plan.    Patient Instructions (the written plan) was given to the patient at check out.    Aileen was seen today for medicare annual wellness visit subsequent.  Diagnoses and all orders for this visit:  Well adult exam (Primary)  -     ECG 12 lead  -     Uric acid; Future  -     Hemoglobin A1C; Future  -     Comprehensive Metabolic Panel; Future  -     TSH with reflex to Free T4 if abnormal; Future  -     Vitamin D 25-Hydroxy,Total (for eval of Vitamin D levels); Future  -     CBC and Auto Differential; Future  -     Lipid Panel; Future  Hypertension, essential  -     ECG 12 lead  -     Uric acid; Future  -     Hemoglobin A1C; Future  -     Comprehensive Metabolic Panel; Future  -     TSH with reflex to Free T4 if abnormal; Future  -     Vitamin D 25-Hydroxy,Total (for eval of Vitamin D levels); Future  -     CBC and Auto Differential; Future  -     Lipid Panel; Future  -     CT cardiac scoring wo IV contrast; Future  Mixed hyperlipidemia  -     ECG 12 lead  -     Uric acid; Future  -     Hemoglobin A1C; Future  -     Comprehensive Metabolic Panel; Future  -     TSH with reflex to Free T4 if abnormal; Future  -     Vitamin D 25-Hydroxy,Total (for eval of Vitamin D levels); Future  -     CBC and Auto Differential; Future  -     Lipid Panel; Future  Elevated blood sugar  -      ECG 12 lead  -     Uric acid; Future  -     Hemoglobin A1C; Future  -     Comprehensive Metabolic Panel; Future  -     TSH with reflex to Free T4 if abnormal; Future  -     Vitamin D 25-Hydroxy,Total (for eval of Vitamin D levels); Future  -     CBC and Auto Differential; Future  -     Lipid Panel; Future  Vitamin D deficiency  -     ECG 12 lead  -     Uric acid; Future  -     Hemoglobin A1C; Future  -     Comprehensive Metabolic Panel; Future  -     TSH with reflex to Free T4 if abnormal; Future  -     Vitamin D 25-Hydroxy,Total (for eval of Vitamin D levels); Future  -     CBC and Auto Differential; Future  -     Lipid Panel; Future  Gastroesophageal reflux disease without esophagitis  -     ECG 12 lead  -     Uric acid; Future  -     Hemoglobin A1C; Future  -     Comprehensive Metabolic Panel; Future  -     TSH with reflex to Free T4 if abnormal; Future  -     Vitamin D 25-Hydroxy,Total (for eval of Vitamin D levels); Future  -     CBC and Auto Differential; Future  -     Lipid Panel; Future  Mood disorder (CMS/HCC)  -     ECG 12 lead  -     Uric acid; Future  -     Hemoglobin A1C; Future  -     Comprehensive Metabolic Panel; Future  -     TSH with reflex to Free T4 if abnormal; Future  -     Vitamin D 25-Hydroxy,Total (for eval of Vitamin D levels); Future  -     CBC and Auto Differential; Future  -     Lipid Panel; Future  Anxiety  -     ALPRAZolam (Xanax) 0.25 mg tablet; Take 1 tablet (0.25 mg) by mouth 2 times a day as needed for anxiety.  -     ECG 12 lead  -     Uric acid; Future  -     Hemoglobin A1C; Future  -     Comprehensive Metabolic Panel; Future  -     TSH with reflex to Free T4 if abnormal; Future  -     Vitamin D 25-Hydroxy,Total (for eval of Vitamin D levels); Future  -     CBC and Auto Differential; Future  -     Lipid Panel; Future  Class 2 severe obesity due to excess calories with serious comorbidity and body mass index (BMI) of 35.0 to 35.9 in adult (CMS/HCC)  -     ECG 12 lead  -     Uric  acid; Future  -     Hemoglobin A1C; Future  -     Comprehensive Metabolic Panel; Future  -     TSH with reflex to Free T4 if abnormal; Future  -     Vitamin D 25-Hydroxy,Total (for eval of Vitamin D levels); Future  -     CBC and Auto Differential; Future  -     Lipid Panel; Future  Cluster headache, not intractable, unspecified chronicity pattern  -     ECG 12 lead  -     Uric acid; Future  -     Hemoglobin A1C; Future  -     Comprehensive Metabolic Panel; Future  -     TSH with reflex to Free T4 if abnormal; Future  -     Vitamin D 25-Hydroxy,Total (for eval of Vitamin D levels); Future  -     CBC and Auto Differential; Future  -     Lipid Panel; Future  Other migraine without status migrainosus, not intractable  Comments:  discussed possible se of triptan  will continue for now  Orders:  -     ECG 12 lead  -     Uric acid; Future  -     Hemoglobin A1C; Future  -     Comprehensive Metabolic Panel; Future  -     TSH with reflex to Free T4 if abnormal; Future  -     Vitamin D 25-Hydroxy,Total (for eval of Vitamin D levels); Future  -     CBC and Auto Differential; Future  -     Lipid Panel; Future  Pt understands risk of triptans     Chronic conditions reviewed in the assessment and plan.    Continue medications unless specified otherwise.  Previous labs reviewed.   Other specialty provider notes reviewed.       Annual Wellness exam completed   Preventive Health history reviewed:  Vaccines today: none  Labs ordered    Depression Screening done  Advanced Directives Discussion Completed  Cardiovascular risk discussed and if needed, lifestyle modifications recommended, including nutritional choices, exercise, and elimination of habits contributing to risk.  We agreed on a plan to reduce the current cardiovascular risk.  See ecalc ASCVD Risk  Plus for data discussed regarding risk and risk reduction opportunities.  Aspirin use/disuse was discussed after reviewing the updated guidelines.

## 2023-09-05 DIAGNOSIS — K21.9 GASTROESOPHAGEAL REFLUX DISEASE WITHOUT ESOPHAGITIS: ICD-10-CM

## 2023-09-05 DIAGNOSIS — G62.9 NEUROPATHY: ICD-10-CM

## 2023-09-05 DIAGNOSIS — I10 HYPERTENSION, ESSENTIAL: ICD-10-CM

## 2023-09-05 RX ORDER — OMEPRAZOLE 20 MG/1
20 CAPSULE, DELAYED RELEASE ORAL DAILY
Qty: 90 CAPSULE | Refills: 3 | Status: SHIPPED | OUTPATIENT
Start: 2023-09-05 | End: 2024-04-05 | Stop reason: SDUPTHER

## 2023-09-05 RX ORDER — GABAPENTIN 100 MG/1
200 CAPSULE ORAL SEE ADMIN INSTRUCTIONS
Qty: 270 CAPSULE | Refills: 3 | Status: SHIPPED | OUTPATIENT
Start: 2023-09-05 | End: 2024-02-22 | Stop reason: SDUPTHER

## 2023-09-05 RX ORDER — LOSARTAN POTASSIUM 100 MG/1
100 TABLET ORAL DAILY
Qty: 90 TABLET | Refills: 3 | Status: SHIPPED | OUTPATIENT
Start: 2023-09-05 | End: 2024-05-13 | Stop reason: SDUPTHER

## 2023-09-25 DIAGNOSIS — G62.9 NEUROPATHY: ICD-10-CM

## 2023-09-25 DIAGNOSIS — J30.9 ALLERGIC RHINITIS, UNSPECIFIED SEASONALITY, UNSPECIFIED TRIGGER: ICD-10-CM

## 2023-09-25 RX ORDER — FLUTICASONE PROPIONATE 50 MCG
2 SPRAY, SUSPENSION (ML) NASAL DAILY
Qty: 16 G | Refills: 3 | Status: SHIPPED | OUTPATIENT
Start: 2023-09-25 | End: 2024-01-29 | Stop reason: SDUPTHER

## 2023-10-25 ENCOUNTER — PATIENT MESSAGE (OUTPATIENT)
Dept: PRIMARY CARE | Facility: CLINIC | Age: 73
End: 2023-10-25
Payer: MEDICARE

## 2023-10-25 DIAGNOSIS — I10 HYPERTENSION, ESSENTIAL: ICD-10-CM

## 2023-10-25 RX ORDER — AMLODIPINE BESYLATE 2.5 MG/1
2.5 TABLET ORAL DAILY
Qty: 90 TABLET | Refills: 3 | Status: SHIPPED | OUTPATIENT
Start: 2023-10-25 | End: 2024-01-29 | Stop reason: SDUPTHER

## 2023-10-25 NOTE — TELEPHONE ENCOUNTER
From: Aileen Velasco  To: Luz Maria Allen MD  Sent: 10/25/2023 11:32 AM EDT  Subject: Prescription refill    I need a refill of Amlodipine Besylate 2.5mg  Please send 90 day Rx to [facility] mail order services.

## 2023-11-24 ENCOUNTER — TELEPHONE (OUTPATIENT)
Dept: PRIMARY CARE | Facility: CLINIC | Age: 73
End: 2023-11-24
Payer: MEDICARE

## 2023-12-06 ENCOUNTER — APPOINTMENT (OUTPATIENT)
Dept: PRIMARY CARE | Facility: CLINIC | Age: 73
End: 2023-12-06
Payer: MEDICARE

## 2023-12-06 ENCOUNTER — TELEMEDICINE (OUTPATIENT)
Dept: PRIMARY CARE | Facility: CLINIC | Age: 73
End: 2023-12-06
Payer: MEDICARE

## 2023-12-06 DIAGNOSIS — R73.9 ELEVATED BLOOD SUGAR: ICD-10-CM

## 2023-12-06 DIAGNOSIS — E78.2 MIXED HYPERLIPIDEMIA: ICD-10-CM

## 2023-12-06 DIAGNOSIS — F41.9 ANXIETY: Primary | ICD-10-CM

## 2023-12-06 DIAGNOSIS — Z79.899 MEDICATION MANAGEMENT: ICD-10-CM

## 2023-12-06 DIAGNOSIS — E55.9 VITAMIN D DEFICIENCY: ICD-10-CM

## 2023-12-06 DIAGNOSIS — R21 RASH: ICD-10-CM

## 2023-12-06 PROCEDURE — 99214 OFFICE O/P EST MOD 30 MIN: CPT | Performed by: NURSE PRACTITIONER

## 2023-12-06 RX ORDER — NYSTATIN AND TRIAMCINOLONE ACETONIDE 100000; 1 [USP'U]/G; MG/G
CREAM TOPICAL 2 TIMES DAILY
Qty: 15 G | Refills: 2 | Status: SHIPPED | OUTPATIENT
Start: 2023-12-06

## 2023-12-06 RX ORDER — ALPRAZOLAM 0.25 MG/1
0.25 TABLET ORAL 2 TIMES DAILY PRN
Qty: 60 TABLET | Refills: 1 | Status: SHIPPED | OUTPATIENT
Start: 2024-01-02 | End: 2024-03-06 | Stop reason: SDUPTHER

## 2023-12-06 ASSESSMENT — ANXIETY QUESTIONNAIRES
2. NOT BEING ABLE TO STOP OR CONTROL WORRYING: NEARLY EVERY DAY
3. WORRYING TOO MUCH ABOUT DIFFERENT THINGS: SEVERAL DAYS
4. TROUBLE RELAXING: SEVERAL DAYS
1. FEELING NERVOUS, ANXIOUS, OR ON EDGE: NEARLY EVERY DAY
5. BEING SO RESTLESS THAT IT IS HARD TO SIT STILL: NOT AT ALL
GAD7 TOTAL SCORE: 11
IF YOU CHECKED OFF ANY PROBLEMS ON THIS QUESTIONNAIRE, HOW DIFFICULT HAVE THESE PROBLEMS MADE IT FOR YOU TO DO YOUR WORK, TAKE CARE OF THINGS AT HOME, OR GET ALONG WITH OTHER PEOPLE: SOMEWHAT DIFFICULT
7. FEELING AFRAID AS IF SOMETHING AWFUL MIGHT HAPPEN: NOT AT ALL
6. BECOMING EASILY ANNOYED OR IRRITABLE: NEARLY EVERY DAY

## 2023-12-06 ASSESSMENT — PATIENT HEALTH QUESTIONNAIRE - PHQ9
2. FEELING DOWN, DEPRESSED OR HOPELESS: NOT AT ALL
SUM OF ALL RESPONSES TO PHQ9 QUESTIONS 1 AND 2: 0
1. LITTLE INTEREST OR PLEASURE IN DOING THINGS: NOT AT ALL

## 2023-12-06 NOTE — PROGRESS NOTES
An interactive audio/visual  telecommunication system which permits real time communications between the patient (at the originating site) and provider (at the distant site) was utilized to provide this telehealth service.    Verbal consent was requested and obtained from the patient for this telehealth visit.  We have confirmed the patient wishes to see me, Nunu Gonzalez, a board certified nurse practitioner with an active [place] advanced practice license as well as verified the patient's identity and physical location in [place].      Problem List Items Addressed This Visit       Anxiety - Primary    Overview     sx managed - multiple family stressors presently   no AE or SE.  continue current dose xanax prn  Cymbalta also          Relevant Medications    ALPRAZolam (Xanax) 0.25 mg tablet (Start on 1/2/2024)    Elevated blood sugar    Overview     10/23 HA1C = 5.7  Continue to monitor with this slight increase from 5.3         Medication management    Overview     CSA, UDS, OARRS, CSV [facility] compliant   Q3m           Mixed hyperlipidemia    Current Assessment & Plan     Reviewed 10/23 labs  Stable  Continue statin         Vitamin D deficiency    Overview     10/23 labs stable  Cont supplement           Other Visit Diagnoses       Rash        try mycolog    Relevant Medications    nystatin-triamcinolone (Mycolog II) cream             Controlled Substance Visit:  I have personally reviewed the OARRS report and have considered the risks of abuse, dependence, addiction and diversion and I believe that it is clinically appropriate for the patient to be prescribed this medication.    Is the patient prescribed a combination of a benzodiazepine and opioid?  No    Last Urine Drug Screen / ordered today: No  Recent Results (from the past 8760 hour(s))   OPIATE/OPIOID/BENZO PRESCRIPTION COMPLIANCE    Collection Time: 04/05/23  2:31 PM   Result Value Ref Range    DRUG SCREEN COMMENT URINE SEE BELOW     Creatine, Urine 38.0 mg/dL     Amphetamine Screen, Urine PRESUMPTIVE NEGATIVE NEGATIVE    Barbiturate Screen, Urine PRESUMPTIVE NEGATIVE NEGATIVE    Cannabinoid Screen, Urine PRESUMPTIVE NEGATIVE NEGATIVE    Cocaine Screen, Urine PRESUMPTIVE NEGATIVE NEGATIVE    PCP Screen, Urine PRESUMPTIVE NEGATIVE NEGATIVE    7-Aminoclonazepam <25 Cutoff <25 ng/mL    Alpha-Hydroxyalprazolam 37 (A) Cutoff <25 ng/mL    Alpha-Hydroxymidazolam <25 Cutoff <25 ng/mL    Alprazolam <25 Cutoff <25 ng/mL    Chlordiazepoxide <25 Cutoff <25 ng/mL    Clonazepam <25 Cutoff <25 ng/mL    Diazepam <25 Cutoff <25 ng/mL    Lorazepam <25 Cutoff <25 ng/mL    Midazolam <25 Cutoff <25 ng/mL    Nordiazepam <25 Cutoff <25 ng/mL    Oxazepam <25 Cutoff <25 ng/mL    Temazepam <25 Cutoff <25 ng/mL    Zolpidem <25 Cutoff <25 ng/mL    Zolpidem Metabolite (ZCA) <25 Cutoff <25 ng/mL    6-Acetylmorphine <25 Cutoff <25 ng/mL    Codeine <50 Cutoff <50 ng/mL    Hydrocodone <25 Cutoff <25 ng/mL    Hydromorphone <25 Cutoff <25 ng/mL    Morphine Urine <50 Cutoff <50 ng/mL    Norhydrocodone <25 Cutoff <25 ng/mL    Noroxycodone <25 Cutoff <25 ng/mL    Oxycodone <25 Cutoff <25 ng/mL    Oxymorphone <25 Cutoff <25 ng/mL    Tramadol <50 Cutoff <50 ng/mL    O-Desmethyltramadol <50 Cutoff <50 ng/mL    Fentanyl <2.5 Cutoff<2.5 ng/mL    Norfentanyl <2.5 Cutoff<2.5 ng/mL    METHADONE CONFIRMATION,URINE <25 Cutoff <25 ng/mL    EDDP <25 Cutoff <25 ng/mL     Results are as expected.     Controlled Substance Agreement:  Date of the Last Agreement: 4/5/23  I have reviewed each line item on the Controlled Substance Agreement including, but not limited to, the benefits, risks, and alternatives to treatment with a Controlled Substance medication(s). The patient has verbalized understanding and signed the agreement.    Benzodiazepines:  What is the patient's goal of therapy? anxiety  Is this being achieved with current treatment? yes    LOC-7:  Over the last 2 weeks, how often have you been bothered by any of the  following problems?  Feeling nervous, anxious, or on edge: 3  Not being able to stop or control worrying: 3  Worrying too much about different things: 1  Trouble relaxin  Being so restless that it is hard to sit still: 0  Becoming easily annoyed or irritable: 3  Feeling afraid as if something awful might happen: 0  LOC-7 Total Score: 11    Family stressors  She was trying to wean xanax  - has gone back to BID dosing    Activities of Daily Living:   Is your overall impression that this patient is benefiting (symptom reduction outweighs side effects) from benzodiazepine therapy? Yes     1. Physical Functioning: Better  2. Family Relationship: Better  3. Social Relationship: Better  4. Mood: Better  5. Sleep Patterns: Same  6. Overall Function: Better    Reviewed 10/26/23 [facility] drawn labs:  HA1C = 5.7  Last 5.3  All other labs stable    Gen: Alert, NAD  Respiratory:  resp effort NL  Neuro:  coordination intact   Mood: normal    Sitting upright

## 2024-01-29 DIAGNOSIS — I10 HYPERTENSION, ESSENTIAL: ICD-10-CM

## 2024-01-29 DIAGNOSIS — F39 MOOD DISORDER (CMS-HCC): ICD-10-CM

## 2024-01-29 DIAGNOSIS — J30.9 ALLERGIC RHINITIS, UNSPECIFIED SEASONALITY, UNSPECIFIED TRIGGER: ICD-10-CM

## 2024-01-29 DIAGNOSIS — E78.2 MIXED HYPERLIPIDEMIA: ICD-10-CM

## 2024-01-29 RX ORDER — DULOXETIN HYDROCHLORIDE 30 MG/1
30 CAPSULE, DELAYED RELEASE ORAL DAILY
Qty: 90 CAPSULE | Refills: 3 | Status: SHIPPED | OUTPATIENT
Start: 2024-01-29 | End: 2024-05-13 | Stop reason: SDUPTHER

## 2024-01-29 RX ORDER — PRAVASTATIN SODIUM 20 MG/1
20 TABLET ORAL NIGHTLY
Qty: 90 TABLET | Refills: 3 | Status: SHIPPED | OUTPATIENT
Start: 2024-01-29 | End: 2024-05-13 | Stop reason: SDUPTHER

## 2024-01-29 RX ORDER — FLUTICASONE PROPIONATE 50 MCG
2 SPRAY, SUSPENSION (ML) NASAL DAILY
Qty: 16 G | Refills: 3 | Status: SHIPPED | OUTPATIENT
Start: 2024-01-29 | End: 2024-02-22 | Stop reason: SDUPTHER

## 2024-01-29 RX ORDER — AMLODIPINE BESYLATE 2.5 MG/1
2.5 TABLET ORAL DAILY
Qty: 90 TABLET | Refills: 3 | Status: SHIPPED | OUTPATIENT
Start: 2024-01-29 | End: 2024-04-05 | Stop reason: SDUPTHER

## 2024-01-29 NOTE — TELEPHONE ENCOUNTER
Pt left my chart message requesting refills   new mail order place - [facility]:  Amlodipine Besylate 2.5mg  Pravastatin Sodium  20 mg  Duloxetine HCL 30 Mg  Fluticasone Propionate 50 mcg  Last appt 12/06/2023  Upcoming appt 03/06/2024

## 2024-02-22 DIAGNOSIS — J30.9 ALLERGIC RHINITIS, UNSPECIFIED SEASONALITY, UNSPECIFIED TRIGGER: ICD-10-CM

## 2024-02-22 DIAGNOSIS — G62.9 NEUROPATHY: ICD-10-CM

## 2024-02-22 RX ORDER — GABAPENTIN 100 MG/1
200 CAPSULE ORAL SEE ADMIN INSTRUCTIONS
Qty: 360 CAPSULE | Refills: 3 | Status: SHIPPED | OUTPATIENT
Start: 2024-02-22 | End: 2024-04-05 | Stop reason: SDUPTHER

## 2024-02-22 RX ORDER — FLUTICASONE PROPIONATE 50 MCG
2 SPRAY, SUSPENSION (ML) NASAL DAILY
Qty: 48 G | Refills: 3 | Status: SHIPPED | OUTPATIENT
Start: 2024-02-22 | End: 2025-02-21

## 2024-02-26 PROBLEM — N81.10 FEMALE BLADDER PROLAPSE: Status: ACTIVE | Noted: 2020-05-29

## 2024-02-26 PROBLEM — R07.9 CHEST PAIN: Status: ACTIVE | Noted: 2024-02-25

## 2024-02-26 PROBLEM — R79.89 ELEVATED D-DIMER: Status: ACTIVE | Noted: 2024-02-25

## 2024-02-26 PROBLEM — M25.521 RIGHT ELBOW PAIN: Status: ACTIVE | Noted: 2023-06-29

## 2024-02-26 PROBLEM — G43.909 MIGRAINE: Status: ACTIVE | Noted: 2019-01-05

## 2024-02-26 PROBLEM — I86.3 VULVAR VARICOSE VEINS: Status: ACTIVE | Noted: 2020-05-29

## 2024-02-26 PROBLEM — N95.2 ATROPHIC VAGINITIS: Status: ACTIVE | Noted: 2024-02-26

## 2024-02-26 PROBLEM — E66.9 OBESITY, CLASS I, BMI 30-34.9: Status: ACTIVE | Noted: 2019-01-06

## 2024-02-26 PROBLEM — N84.2 POLYP OF VAGINA: Status: ACTIVE | Noted: 2024-02-26

## 2024-02-26 PROBLEM — J45.40 MODERATE PERSISTENT ASTHMA WITHOUT COMPLICATION (HHS-HCC): Status: ACTIVE | Noted: 2020-01-17

## 2024-02-26 PROBLEM — R35.0 URINARY FREQUENCY: Status: ACTIVE | Noted: 2020-05-29

## 2024-02-26 PROBLEM — I30.1 ACUTE VIRAL PERICARDITIS (HHS-HCC): Status: ACTIVE | Noted: 2024-02-25

## 2024-02-26 PROBLEM — I32: Status: ACTIVE | Noted: 2024-02-25

## 2024-02-26 PROBLEM — E66.811 OBESITY, CLASS I, BMI 30-34.9: Status: ACTIVE | Noted: 2019-01-06

## 2024-02-26 PROBLEM — K63.5 POLYP OF COLON: Status: ACTIVE | Noted: 2024-02-26

## 2024-02-27 ENCOUNTER — PATIENT OUTREACH (OUTPATIENT)
Dept: PRIMARY CARE | Facility: CLINIC | Age: 74
End: 2024-02-27
Payer: MEDICARE

## 2024-02-27 ENCOUNTER — DOCUMENTATION (OUTPATIENT)
Dept: PRIMARY CARE | Facility: CLINIC | Age: 74
End: 2024-02-27
Payer: MEDICARE

## 2024-02-27 RX ORDER — COLCHICINE 0.6 MG/1
0.6 TABLET ORAL
COMMUNITY
Start: 2024-02-26 | End: 2024-04-05 | Stop reason: WASHOUT

## 2024-02-27 NOTE — PROGRESS NOTES
Discharge Facility: [facility]  Discharge Diagnosis: Chest Pain  Admission Date: 2/25/24  Discharge Date: 2/26/24    PCP Appointment Date: 3/6/24  Specialist Appointment Date: unknown  Hospital Encounter and Summary: Linked     See discharge assessment below for further details    Engagement  Call Start Time: 1045 (2/27/2024 10:48 AM)    Medications  Medications reviewed with patient/caregiver?: Yes (2/27/2024 10:48 AM)  Is the patient having any side effects they believe may be caused by any medication additions or changes?: No (2/27/2024 10:48 AM)  Does the patient have all medications ordered at discharge?: Yes (2/27/2024 10:48 AM)  Prescription Comments: pt sent home with scripts (2/27/2024 10:48 AM)  Is the patient taking all medications as directed (includes completed medication regime)?: Yes (2/27/2024 10:48 AM)  Medication Comments: Pt denies problems obtaining or affording medication (2/27/2024 10:48 AM)    Appointments  Does the patient have a primary care provider?: Yes (2/27/2024 10:48 AM)  Care Management Interventions: Verified appointment date/time/provider (2/27/2024 10:48 AM)  Has the patient kept scheduled appointments due by today?: Yes (2/27/2024 10:48 AM)  Care Management Interventions: Advised patient to keep appointment (2/27/2024 10:48 AM)    Self Management  What is the home health agency?: n/a (2/27/2024 10:48 AM)  Has home health visited the patient within 72 hours of discharge?: Not applicable (2/27/2024 10:48 AM)    Patient Teaching  Does the patient have access to their discharge instructions?: Yes (2/27/2024 10:48 AM)  Care Management Interventions: Reviewed instructions with patient (2/27/2024 10:48 AM)  What is the patient's perception of their health status since discharge?: Improving (2/27/2024 10:48 AM)  Is the patient/caregiver able to teach back the hierarchy of who to call/visit for symptoms/problems? PCP, Specialist, Home Health nurse, Urgent Care, ED, 911: Yes (2/27/2024  10:48 AM)      Ronnell Lerma LPN

## 2024-03-06 ENCOUNTER — LAB (OUTPATIENT)
Dept: LAB | Facility: LAB | Age: 74
End: 2024-03-06
Payer: MEDICARE

## 2024-03-06 ENCOUNTER — OFFICE VISIT (OUTPATIENT)
Dept: PRIMARY CARE | Facility: CLINIC | Age: 74
End: 2024-03-06
Payer: MEDICARE

## 2024-03-06 VITALS
WEIGHT: 185 LBS | OXYGEN SATURATION: 98 % | HEART RATE: 80 BPM | SYSTOLIC BLOOD PRESSURE: 137 MMHG | TEMPERATURE: 96.4 F | DIASTOLIC BLOOD PRESSURE: 82 MMHG | BODY MASS INDEX: 36.13 KG/M2

## 2024-03-06 DIAGNOSIS — E55.9 VITAMIN D DEFICIENCY: ICD-10-CM

## 2024-03-06 DIAGNOSIS — G44.009 CLUSTER HEADACHE, NOT INTRACTABLE, UNSPECIFIED CHRONICITY PATTERN: ICD-10-CM

## 2024-03-06 DIAGNOSIS — R60.0 LOCALIZED EDEMA: ICD-10-CM

## 2024-03-06 DIAGNOSIS — K21.9 GASTROESOPHAGEAL REFLUX DISEASE WITHOUT ESOPHAGITIS: ICD-10-CM

## 2024-03-06 DIAGNOSIS — R73.9 ELEVATED BLOOD SUGAR: ICD-10-CM

## 2024-03-06 DIAGNOSIS — Z79.899 MEDICATION MANAGEMENT: ICD-10-CM

## 2024-03-06 DIAGNOSIS — E66.01 CLASS 2 SEVERE OBESITY DUE TO EXCESS CALORIES WITH SERIOUS COMORBIDITY AND BODY MASS INDEX (BMI) OF 35.0 TO 35.9 IN ADULT (MULTI): ICD-10-CM

## 2024-03-06 DIAGNOSIS — G43.809 OTHER MIGRAINE WITHOUT STATUS MIGRAINOSUS, NOT INTRACTABLE: ICD-10-CM

## 2024-03-06 DIAGNOSIS — Z00.00 WELL ADULT EXAM: ICD-10-CM

## 2024-03-06 DIAGNOSIS — I10 HYPERTENSION, ESSENTIAL: ICD-10-CM

## 2024-03-06 DIAGNOSIS — I32 PERICARDITIS IN DISEASES CLASSIFIED ELSEWHERE (HHS-HCC): Primary | ICD-10-CM

## 2024-03-06 DIAGNOSIS — F41.9 ANXIETY: ICD-10-CM

## 2024-03-06 DIAGNOSIS — E78.2 MIXED HYPERLIPIDEMIA: ICD-10-CM

## 2024-03-06 DIAGNOSIS — F39 MOOD DISORDER (CMS-HCC): ICD-10-CM

## 2024-03-06 DIAGNOSIS — I32 PERICARDITIS IN DISEASES CLASSIFIED ELSEWHERE (HHS-HCC): ICD-10-CM

## 2024-03-06 LAB
25(OH)D3 SERPL-MCNC: 55 NG/ML (ref 30–100)
ALBUMIN SERPL BCP-MCNC: 4.3 G/DL (ref 3.4–5)
ALP SERPL-CCNC: 42 U/L (ref 33–136)
ALT SERPL W P-5'-P-CCNC: 33 U/L (ref 7–45)
ANION GAP SERPL CALC-SCNC: 9 MMOL/L (ref 10–20)
AST SERPL W P-5'-P-CCNC: 24 U/L (ref 9–39)
BASOPHILS # BLD AUTO: 0.06 X10*3/UL (ref 0–0.1)
BASOPHILS NFR BLD AUTO: 1.4 %
BILIRUB SERPL-MCNC: 0.5 MG/DL (ref 0–1.2)
BUN SERPL-MCNC: 20 MG/DL (ref 6–23)
CALCIUM SERPL-MCNC: 9.7 MG/DL (ref 8.6–10.3)
CHLORIDE SERPL-SCNC: 104 MMOL/L (ref 98–107)
CHOLEST SERPL-MCNC: 178 MG/DL (ref 0–199)
CHOLESTEROL/HDL RATIO: 3.3
CO2 SERPL-SCNC: 31 MMOL/L (ref 21–32)
CREAT SERPL-MCNC: 0.71 MG/DL (ref 0.5–1.05)
CRP SERPL-MCNC: 0.13 MG/DL
EGFRCR SERPLBLD CKD-EPI 2021: 90 ML/MIN/1.73M*2
EOSINOPHIL # BLD AUTO: 0.09 X10*3/UL (ref 0–0.4)
EOSINOPHIL NFR BLD AUTO: 2.1 %
ERYTHROCYTE [DISTWIDTH] IN BLOOD BY AUTOMATED COUNT: 12.9 % (ref 11.5–14.5)
EST. AVERAGE GLUCOSE BLD GHB EST-MCNC: 120 MG/DL
GLUCOSE SERPL-MCNC: 78 MG/DL (ref 74–99)
HBA1C MFR BLD: 5.8 %
HCT VFR BLD AUTO: 41.7 % (ref 36–46)
HDLC SERPL-MCNC: 54 MG/DL
HGB BLD-MCNC: 13.7 G/DL (ref 12–16)
IMM GRANULOCYTES # BLD AUTO: 0.01 X10*3/UL (ref 0–0.5)
IMM GRANULOCYTES NFR BLD AUTO: 0.2 % (ref 0–0.9)
LDLC SERPL CALC-MCNC: 95 MG/DL
LYMPHOCYTES # BLD AUTO: 1.37 X10*3/UL (ref 0.8–3)
LYMPHOCYTES NFR BLD AUTO: 32.7 %
MCH RBC QN AUTO: 30.9 PG (ref 26–34)
MCHC RBC AUTO-ENTMCNC: 32.9 G/DL (ref 32–36)
MCV RBC AUTO: 94 FL (ref 80–100)
MONOCYTES # BLD AUTO: 0.4 X10*3/UL (ref 0.05–0.8)
MONOCYTES NFR BLD AUTO: 9.5 %
NEUTROPHILS # BLD AUTO: 2.26 X10*3/UL (ref 1.6–5.5)
NEUTROPHILS NFR BLD AUTO: 54.1 %
NON HDL CHOLESTEROL: 124 MG/DL (ref 0–149)
NRBC BLD-RTO: 0 /100 WBCS (ref 0–0)
PLATELET # BLD AUTO: 267 X10*3/UL (ref 150–450)
POTASSIUM SERPL-SCNC: 4 MMOL/L (ref 3.5–5.3)
PROT SERPL-MCNC: 6.9 G/DL (ref 6.4–8.2)
RBC # BLD AUTO: 4.44 X10*6/UL (ref 4–5.2)
SODIUM SERPL-SCNC: 140 MMOL/L (ref 136–145)
TRIGL SERPL-MCNC: 145 MG/DL (ref 0–149)
TSH SERPL-ACNC: 1.91 MIU/L (ref 0.44–3.98)
URATE SERPL-MCNC: 3.7 MG/DL (ref 2.3–6.7)
VLDL: 29 MG/DL (ref 0–40)
WBC # BLD AUTO: 4.2 X10*3/UL (ref 4.4–11.3)

## 2024-03-06 PROCEDURE — 3008F BODY MASS INDEX DOCD: CPT | Performed by: INTERNAL MEDICINE

## 2024-03-06 PROCEDURE — 80358 DRUG SCREENING METHADONE: CPT

## 2024-03-06 PROCEDURE — 80368 SEDATIVE HYPNOTICS: CPT

## 2024-03-06 PROCEDURE — 84443 ASSAY THYROID STIM HORMONE: CPT

## 2024-03-06 PROCEDURE — 3075F SYST BP GE 130 - 139MM HG: CPT | Performed by: INTERNAL MEDICINE

## 2024-03-06 PROCEDURE — 99495 TRANSJ CARE MGMT MOD F2F 14D: CPT | Performed by: INTERNAL MEDICINE

## 2024-03-06 PROCEDURE — 84550 ASSAY OF BLOOD/URIC ACID: CPT

## 2024-03-06 PROCEDURE — 85025 COMPLETE CBC W/AUTO DIFF WBC: CPT

## 2024-03-06 PROCEDURE — 80053 COMPREHEN METABOLIC PANEL: CPT

## 2024-03-06 PROCEDURE — 80361 OPIATES 1 OR MORE: CPT

## 2024-03-06 PROCEDURE — 82306 VITAMIN D 25 HYDROXY: CPT

## 2024-03-06 PROCEDURE — 82570 ASSAY OF URINE CREATININE: CPT

## 2024-03-06 PROCEDURE — 80346 BENZODIAZEPINES1-12: CPT

## 2024-03-06 PROCEDURE — 1123F ACP DISCUSS/DSCN MKR DOCD: CPT | Performed by: INTERNAL MEDICINE

## 2024-03-06 PROCEDURE — 80354 DRUG SCREENING FENTANYL: CPT

## 2024-03-06 PROCEDURE — 86140 C-REACTIVE PROTEIN: CPT

## 2024-03-06 PROCEDURE — 1159F MED LIST DOCD IN RCRD: CPT | Performed by: INTERNAL MEDICINE

## 2024-03-06 PROCEDURE — 3079F DIAST BP 80-89 MM HG: CPT | Performed by: INTERNAL MEDICINE

## 2024-03-06 PROCEDURE — 83036 HEMOGLOBIN GLYCOSYLATED A1C: CPT

## 2024-03-06 PROCEDURE — 80365 DRUG SCREENING OXYCODONE: CPT

## 2024-03-06 PROCEDURE — 1036F TOBACCO NON-USER: CPT | Performed by: INTERNAL MEDICINE

## 2024-03-06 PROCEDURE — 36415 COLL VENOUS BLD VENIPUNCTURE: CPT

## 2024-03-06 PROCEDURE — 80373 DRUG SCREENING TRAMADOL: CPT

## 2024-03-06 PROCEDURE — 1158F ADVNC CARE PLAN TLK DOCD: CPT | Performed by: INTERNAL MEDICINE

## 2024-03-06 PROCEDURE — 80061 LIPID PANEL: CPT

## 2024-03-06 PROCEDURE — 80307 DRUG TEST PRSMV CHEM ANLYZR: CPT

## 2024-03-06 RX ORDER — HYDROCHLOROTHIAZIDE 25 MG/1
25 TABLET ORAL DAILY
Qty: 30 TABLET | Refills: 0 | Status: SHIPPED | OUTPATIENT
Start: 2024-03-06 | End: 2024-04-10 | Stop reason: SDUPTHER

## 2024-03-06 RX ORDER — HYDROCHLOROTHIAZIDE 25 MG/1
25 TABLET ORAL DAILY
Qty: 30 TABLET | Refills: 0 | Status: SHIPPED | OUTPATIENT
Start: 2024-03-06 | End: 2024-03-06 | Stop reason: SDUPTHER

## 2024-03-06 RX ORDER — INDOMETHACIN 25 MG/1
25 CAPSULE ORAL
COMMUNITY
End: 2024-04-05 | Stop reason: WASHOUT

## 2024-03-06 RX ORDER — ALPRAZOLAM 0.25 MG/1
0.25 TABLET ORAL 2 TIMES DAILY PRN
Qty: 60 TABLET | Refills: 1 | Status: SHIPPED | OUTPATIENT
Start: 2024-03-06

## 2024-03-06 RX ORDER — POTASSIUM CHLORIDE 600 MG/1
8 TABLET, FILM COATED, EXTENDED RELEASE ORAL DAILY
Qty: 30 TABLET | Refills: 11
Start: 2024-03-06 | End: 2025-03-06

## 2024-03-06 RX ORDER — ALPRAZOLAM 0.25 MG/1
0.25 TABLET ORAL 2 TIMES DAILY PRN
Qty: 60 TABLET | Refills: 1 | Status: SHIPPED | OUTPATIENT
Start: 2024-03-06 | End: 2024-03-06 | Stop reason: SDUPTHER

## 2024-03-06 ASSESSMENT — ANXIETY QUESTIONNAIRES
IF YOU CHECKED OFF ANY PROBLEMS ON THIS QUESTIONNAIRE, HOW DIFFICULT HAVE THESE PROBLEMS MADE IT FOR YOU TO DO YOUR WORK, TAKE CARE OF THINGS AT HOME, OR GET ALONG WITH OTHER PEOPLE: NOT DIFFICULT AT ALL
GAD7 TOTAL SCORE: 1
1. FEELING NERVOUS, ANXIOUS, OR ON EDGE: SEVERAL DAYS
7. FEELING AFRAID AS IF SOMETHING AWFUL MIGHT HAPPEN: NOT AT ALL
2. NOT BEING ABLE TO STOP OR CONTROL WORRYING: NOT AT ALL
4. TROUBLE RELAXING: NOT AT ALL
6. BECOMING EASILY ANNOYED OR IRRITABLE: NOT AT ALL
5. BEING SO RESTLESS THAT IT IS HARD TO SIT STILL: NOT AT ALL
3. WORRYING TOO MUCH ABOUT DIFFERENT THINGS: NOT AT ALL

## 2024-03-06 ASSESSMENT — PATIENT HEALTH QUESTIONNAIRE - PHQ9
2. FEELING DOWN, DEPRESSED OR HOPELESS: SEVERAL DAYS
1. LITTLE INTEREST OR PLEASURE IN DOING THINGS: NOT AT ALL
1. LITTLE INTEREST OR PLEASURE IN DOING THINGS: NOT AT ALL
10. IF YOU CHECKED OFF ANY PROBLEMS, HOW DIFFICULT HAVE THESE PROBLEMS MADE IT FOR YOU TO DO YOUR WORK, TAKE CARE OF THINGS AT HOME, OR GET ALONG WITH OTHER PEOPLE: NOT DIFFICULT AT ALL
SUM OF ALL RESPONSES TO PHQ9 QUESTIONS 1 AND 2: 1
2. FEELING DOWN, DEPRESSED OR HOPELESS: NOT AT ALL
SUM OF ALL RESPONSES TO PHQ9 QUESTIONS 1 AND 2: 0

## 2024-03-06 NOTE — PROGRESS NOTES
Problem List Items Addressed This Visit             ICD-10-CM    Anxiety F41.9    Relevant Medications    hydroCHLOROthiazide (HYDRODiuril) 25 mg tablet    potassium chloride CR (Klor-Con) 8 mEq ER tablet    ALPRAZolam (Xanax) 0.25 mg tablet    Other Relevant Orders    Basic Metabolic Panel    C-reactive protein    CBC and Auto Differential    Medication management Z79.899    Relevant Medications    hydroCHLOROthiazide (HYDRODiuril) 25 mg tablet    potassium chloride CR (Klor-Con) 8 mEq ER tablet    Other Relevant Orders    Opiate/Opioid/Benzo Prescription Compliance    OOB Internal Tracking    Basic Metabolic Panel    C-reactive protein    CBC and Auto Differential    Pericarditis in diseases classified elsewhere - Primary I32    Relevant Medications    hydroCHLOROthiazide (HYDRODiuril) 25 mg tablet    potassium chloride CR (Klor-Con) 8 mEq ER tablet    Other Relevant Orders    Basic Metabolic Panel    C-reactive protein    CBC and Auto Differential     Other Visit Diagnoses         Codes    Localized edema     R60.0    for  now inc potassium  and inc hctz             Controlled Substance Visit:  I have personally reviewed the OARRS report and have considered the risks of abuse, dependence, addiction and diversion and I believe that it is clinically appropriate for the patient to be prescribed this medication.  Had norovirus  Then got cp and went to er w cp and back pain  Pain w breathing  No [facility]  Ct done  Indocin and colchicine   Echo did not get done  3 rd week of march  Echo to done  Lots of swelling in feet after the day  Bp at home 137//80  Sister moved up   Stressful  Saw podiatry  Tendon torn, has to heal on own  Cards fu march 29  Is the patient prescribed a combination of a benzodiazepine and opioid?  No    Last Urine Drug Screen / ordered today: Yes  Recent Results (from the past 8760 hour(s))   OPIATE/OPIOID/BENZO PRESCRIPTION COMPLIANCE    Collection Time: 04/05/23  2:31 PM    Result Value Ref Range    DRUG SCREEN COMMENT URINE SEE BELOW     Creatine, Urine 38.0 mg/dL    Amphetamine Screen, Urine PRESUMPTIVE NEGATIVE NEGATIVE    Barbiturate Screen, Urine PRESUMPTIVE NEGATIVE NEGATIVE    Cannabinoid Screen, Urine PRESUMPTIVE NEGATIVE NEGATIVE    Cocaine Screen, Urine PRESUMPTIVE NEGATIVE NEGATIVE    PCP Screen, Urine PRESUMPTIVE NEGATIVE NEGATIVE    7-Aminoclonazepam <25 Cutoff <25 ng/mL    Alpha-Hydroxyalprazolam 37 (A) Cutoff <25 ng/mL    Alpha-Hydroxymidazolam <25 Cutoff <25 ng/mL    Alprazolam <25 Cutoff <25 ng/mL    Chlordiazepoxide <25 Cutoff <25 ng/mL    Clonazepam <25 Cutoff <25 ng/mL    Diazepam <25 Cutoff <25 ng/mL    Lorazepam <25 Cutoff <25 ng/mL    Midazolam <25 Cutoff <25 ng/mL    Nordiazepam <25 Cutoff <25 ng/mL    Oxazepam <25 Cutoff <25 ng/mL    Temazepam <25 Cutoff <25 ng/mL    Zolpidem <25 Cutoff <25 ng/mL    Zolpidem Metabolite (ZCA) <25 Cutoff <25 ng/mL    6-Acetylmorphine <25 Cutoff <25 ng/mL    Codeine <50 Cutoff <50 ng/mL    Hydrocodone <25 Cutoff <25 ng/mL    Hydromorphone <25 Cutoff <25 ng/mL    Morphine Urine <50 Cutoff <50 ng/mL    Norhydrocodone <25 Cutoff <25 ng/mL    Noroxycodone <25 Cutoff <25 ng/mL    Oxycodone <25 Cutoff <25 ng/mL    Oxymorphone <25 Cutoff <25 ng/mL    Tramadol <50 Cutoff <50 ng/mL    O-Desmethyltramadol <50 Cutoff <50 ng/mL    Fentanyl <2.5 Cutoff<2.5 ng/mL    Norfentanyl <2.5 Cutoff<2.5 ng/mL    METHADONE CONFIRMATION,URINE <25 Cutoff <25 ng/mL    EDDP <25 Cutoff <25 ng/mL     Results are as expected.     Controlled Substance Agreement:  Date of the Last Agreement: 03/06/2024  I have reviewed each line item on the Controlled Substance Agreement including, but not limited to, the benefits, risks, and alternatives to treatment with a Controlled Substance medication(s). The patient has verbalized understanding and signed the agreement.    Benzodiazepines:  What is the patient's goal of therapy? Anxiety   Is this being achieved with  "current treatment? Yes     LOC-7:  Over the last 2 weeks, how often have you been bothered by any of the following problems?  Feeling nervous, anxious, or on edge: 1  Not being able to stop or control worryin  Worrying too much about different things: 0  Trouble relaxin  Being so restless that it is hard to sit still: 0  Becoming easily annoyed or irritable: 0  Feeling afraid as if something awful might happen: 0  LOC-7 Total Score: 1        Activities of Daily Living:   Is your overall impression that this patient is benefiting (symptom reduction outweighs side effects) from benzodiazepine therapy? Yes     1. Physical Functioning: Same  2. Family Relationship: Better  3. Social Relationship: Better  4. Mood: Better  5. Sleep Patterns: Same  6. Overall Function: Better    Gen: Alert, NAD  HEENT:  PERRL  conjunctiva and sclera normal in appearance; Neck supple  Respiratory:  Lungs CTAB  Cardiovascular:  Heart RRR. No M/R/G  Abdomen: soft, nontender, nondistended   Neuro:  Gross motor and sensory intact  Skin:  No suspicious lesions present   Mood: normal      Patient: Aileen Velasco  : 1950  PCP: Luz Maria Allen MD  MRN: [id number]  Program: No linked episodes    Admit date:  Discharge date:  Discharge DX:     Aileen Velasco is a 73 y.o. female presenting today for follow-up after being discharged from the hospital The discharge summary and/or Transitional Care Management documentation was reviewed. Medication reconciliation was performed as indicated via the \"[place] as Reviewed\" timestamp.     Aileen Velasco was contacted by Transitional Care Management services two days after her discharge. This encounter and supporting documentation was reviewed.    The complexity of medical decision making for this patient's transitional care is high     primary care established   HPI     Hospital course copied:     [facility]  Outside Information  Primo Acosta MD  Physician  Hospital " Medicine     Discharge Summary     Signed     Date of Service: 2024 11:02 AM  Note Received: 2024  8:16 AM         Signed           DISCHARGE SUMMARY      PATIENT NAME: Aileen Velasco   Code Status: Full Code   MRN: [id number]        Highest Readmission Risk Score: 14     The 30 day readmissions risk score is derived from an internally validated risk model which evaluates patient level characteristics, utilization history, medication orders and lab results up until the day of discharge. Patients with a score of 40 or above are considered highest risk for readmission. Specific patient level drivers will be listed at the bottom of the summary.        Admission Information         Admission Information       ADMIT DATE: 2024  DISCHARGE DATE: 24     MY DOCTORS AND MEDICAL TEAM:  My Main Hospital Doctor: Primo Acosta MD  Primary Care Provider: Luz Maria Allen MD, MD  My Medical Team Members: Treatment Team:   Attending Provider: Primo Acosta MD  Consulting: Ronnell Wakefield DO  Primary Service: 3, [facility]     MY CONDITION AT DISCHARGE: Stable     REASON I WAS IN THE HOSPITAL: Pericarditis causing chest pain. Take colchicine and indomethacin to recover faster from this condition. Follow up with a cardiologist to track your recovery progress.     While on indomethacin, take omeprazole twice a day to avoid stomach pain and also discontinue losartan while on indomethacin. Instead, increase your amlodipine to 5 mg instead of 2.5 mg.     Referral to Cardiology placed. Monday-Friday after 9 am, call  at [phone] ext-0 and ask for the appointment line to schedule an appointment.      Have a picture of your heart completed (echocardiogram) to help track your recovery.     Order for Echocardiogram has been placed. Call Central [phone] to have the test arranged.       labs reviewed       SUMMARY OF WHAT HAPPENED WHILE I WAS IN THE HOSPITAL: see above      OTHER PROBLEMS/DIAGNOSIS:  Principal Problem (Resolved):    Chest pain  Active Problems:    EVE (obstructive sleep apnea)    Mixed hyperlipidemia    Essential hypertension    Obesity, Class I, BMI 30-34.9    Acute viral pericarditis  Resolved Problems:    Hypokalemia    Elevated d-dimer    Pericarditis in diseases classified elsewhere        OPERATIONS PERFORMED WHILE IN THE HOSPITAL: None     IMPORTANT TEST/PROCEDURES:   No procedures performed     TEST RESULTS NOT AVAILABLE AT THIS TIME:   No pending results          Discharge Disposition       Discharge Disposition: Home With Self Care             Follow Up Appointments         Follow-Up Appointment       With: Patient's primary care physician     When: In:     Patient/Parents to call for appointment?: Yes     1st available, usual location             Additional Provider to Provider Information:       Ms. Velasco, your 73 years have been affected by GERD/HTN, raynaud's and past episodes of chest/breathing discomfort in which she had a pleural biopsy done years ago but without finding of lupus/autoimmune disease. You presented to the hospital with recent URI about 1 week ago with development of pleuritic pain worse laying flat and improved sitting up for ~24 hrs. Trops normal but has diffuse slight ST elevations.     Improved with pericarditis tx using nsaids/colchicine.     DC on nsaid/colchicine therapy, outpt echo, and f/u with cardiology to track recovery. While on indomethacin, use BID PPI and exchange losartan with higher amlodipine dose.            Transitions of Care Critical Issues:  SPECIALIST FOLLOW-UP: Cardiology  KEY MEDICATION CHANGES: see med lsit     LABS AND PROCEDURES PENDING AT DISCHARGE: No pending results.      FOLLOW-UP APPOINTMENTS ALREADY SCHEDULED WITH A [facility] PROVIDER:         Future Appointments   Date Time Provider Department Center   4/11/2024 11:20 AM Treva Garcia MD [facility]   4/18/2024 11:20 AM Jose  Treva CA MD [facility]   4/26/2024 11:20 AM Treva Garcia MD [facility]   5/24/2024  9:00 AM Rosy Perez DPM PODIST [facility]            ALLERGIES  No Known Allergies     DISCHARGE MEDICATION:       Medication List          START taking these medications       acetaminophen 500 mg tablet  Commonly known as: TYLENOL  Take 2 tablets by mouth every 6 hours as needed for pain.      colchicine 0.6 mg tablet  Take 1 tablet by mouth once daily.  Start taking on: February 27, 2024      indomethacin 25 mg capsule  Commonly known as: INDOCIN  25 mg twice a day for 2 weeks, then 25 mg daily for 2 weeks, then stop.                     Labs copied:    Imaging copied:     ROS otherwise negative aside from what was mentioned above in HPI.    Physical Exam  /82   Pulse 80   Temp 35.8 °C (96.4 °F) (Temporal)   Wt 83.9 kg (185 lb)   SpO2 98%   BMI 36.13 kg/m²   Gen: Alert, NAD  HEENT:  PERRLA, EOMI, conjunctiva and sclera normal in appearance. External auditory canals/TMs normal;; Oral cavity and posterior pharynx without lesions/exudate  Neck:  Supple with FROM; No masses/nodes palpable; No MARKOS  Respiratory:  Lungs CTAB  Cardiovascular:  Heart RRR. No M/R/G.   Neuro:  CN II-XII intact;  back pain causes her to lean over while walking   Grossly intact  Ext trace bl edema   Skin:  No suspicious lesions present      Problem List Items Addressed This Visit       Anxiety    Overview     sx managed - multiple family stressors presently   no AE or SE.  continue current dose xanax prn  Cymbalta also          Relevant Medications    hydroCHLOROthiazide (HYDRODiuril) 25 mg tablet    potassium chloride CR (Klor-Con) 8 mEq ER tablet    ALPRAZolam (Xanax) 0.25 mg tablet    Other Relevant Orders    Basic Metabolic Panel    C-reactive protein    CBC and Auto Differential    Medication management    Overview     CSA, UDS, OARRS, CSV [facility] compliant   Q3m           Relevant Medications    hydroCHLOROthiazide (HYDRODiuril) 25 mg  tablet    potassium chloride CR (Klor-Con) 8 mEq ER tablet    Other Relevant Orders    Opiate/Opioid/Benzo Prescription Compliance    OOB Internal Tracking    Basic Metabolic Panel    C-reactive protein    CBC and Auto Differential    Pericarditis in diseases classified elsewhere - Primary    Relevant Medications    hydroCHLOROthiazide (HYDRODiuril) 25 mg tablet    potassium chloride CR (Klor-Con) 8 mEq ER tablet    Other Relevant Orders    Basic Metabolic Panel    C-reactive protein    CBC and Auto Differential     Other Visit Diagnoses       Localized edema        for  now inc potassium  and inc hctz         Chronic conditions reviewed in the assessment and plan.    Continue medications unless specified otherwise.  Previous labs reviewed.   Other specialty provider notes reviewed.   Follow up in one month or prn   Will get foot orthotics   Sees podiatry   Discharge Facility: [facility]  Discharge Diagnosis: Chest Pain  Admission Date: 2/25/24  Discharge Date: 2/26/24     PCP Appointment Date: 3/6/24  Engagement  Call Start Time: 1045 (2/27/2024 10:48 AM)    Medications  Medications reviewed with patient/caregiver?: Yes (2/27/2024 10:48 AM)  Is the patient having any side effects they believe may be caused by any medication additions or changes?: No (2/27/2024 10:48 AM)  Does the patient have all medications ordered at discharge?: Yes (2/27/2024 10:48 AM)  Prescription Comments: pt sent home with scripts (2/27/2024 10:48 AM)  Is the patient taking all medications as directed (includes completed medication regime)?: Yes (2/27/2024 10:48 AM)  Medication Comments: Pt denies problems obtaining or affording medication (2/27/2024 10:48 AM)    Appointments  Does the patient have a primary care provider?: Yes (2/27/2024 10:48 AM)  Care Management Interventions: Verified appointment date/time/provider (2/27/2024 10:48 AM)  Has the patient kept scheduled appointments due by today?: Yes (2/27/2024 10:48 AM)  Care  Management Interventions: Advised patient to keep appointment (2/27/2024 10:48 AM)    Self Management  What is the home health agency?: n/a (2/27/2024 10:48 AM)  Has home health visited the patient within 72 hours of discharge?: Not applicable (2/27/2024 10:48 AM)    Patient Teaching  Does the patient have access to their discharge instructions?: Yes (2/27/2024 10:48 AM)  Care Management Interventions: Reviewed instructions with patient (2/27/2024 10:48 AM)  What is the patient's perception of their health status since discharge?: Improving (2/27/2024 10:48 AM)  Is the patient/caregiver able to teach back the hierarchy of who to call/visit for symptoms/problems? PCP, Specialist, Home Health nurse, Urgent Care, ED, 911: Yes (2/27/2024 10:48 AM)

## 2024-03-07 DIAGNOSIS — Z12.31 ENCOUNTER FOR SCREENING MAMMOGRAM FOR MALIGNANT NEOPLASM OF BREAST: ICD-10-CM

## 2024-03-07 LAB
AMPHETAMINES UR QL SCN: NORMAL
BARBITURATES UR QL SCN: NORMAL
BZE UR QL SCN: NORMAL
CANNABINOIDS UR QL SCN: NORMAL
CREAT UR-MCNC: 106.8 MG/DL (ref 20–320)
PCP UR QL SCN: NORMAL

## 2024-03-08 ENCOUNTER — PATIENT OUTREACH (OUTPATIENT)
Dept: PRIMARY CARE | Facility: CLINIC | Age: 74
End: 2024-03-08
Payer: MEDICARE

## 2024-03-08 DIAGNOSIS — R79.89 ABNORMAL CBC: ICD-10-CM

## 2024-03-08 NOTE — PROGRESS NOTES
Call regarding appt. with PCP on (3/6/24) after hospitalization.  At time of outreach call the patient feels as if their condition has (improved/worsened/returned to baseline) since last visit.  Reviewed the PCP appointment with the pt and addressed any questions or concerns.    Ronnell Lerma LPN

## 2024-03-13 LAB
1OH-MIDAZOLAM UR CFM-MCNC: <25 NG/ML
6MAM UR CFM-MCNC: <25 NG/ML
7AMINOCLONAZEPAM UR CFM-MCNC: <25 NG/ML
A-OH ALPRAZ UR CFM-MCNC: 135 NG/ML
ALPRAZ UR CFM-MCNC: 32 NG/ML
CHLORDIAZEP UR CFM-MCNC: <25 NG/ML
CLONAZEPAM UR CFM-MCNC: <25 NG/ML
CODEINE UR CFM-MCNC: <50 NG/ML
DIAZEPAM UR CFM-MCNC: <25 NG/ML
EDDP UR CFM-MCNC: <25 NG/ML
FENTANYL UR CFM-MCNC: <2.5 NG/ML
HYDROCODONE CTO UR CFM-MCNC: <25 NG/ML
HYDROMORPHONE UR CFM-MCNC: <25 NG/ML
LORAZEPAM UR CFM-MCNC: <25 NG/ML
METHADONE UR CFM-MCNC: <25 NG/ML
MIDAZOLAM UR CFM-MCNC: <25 NG/ML
MORPHINE UR CFM-MCNC: <50 NG/ML
NORDIAZEPAM UR CFM-MCNC: <25 NG/ML
NORFENTANYL UR CFM-MCNC: <2.5 NG/ML
NORHYDROCODONE UR CFM-MCNC: <25 NG/ML
NOROXYCODONE UR CFM-MCNC: <25 NG/ML
NORTRAMADOL UR-MCNC: <50 NG/ML
OXAZEPAM UR CFM-MCNC: <25 NG/ML
OXYCODONE UR CFM-MCNC: <25 NG/ML
OXYMORPHONE UR CFM-MCNC: <25 NG/ML
TEMAZEPAM UR CFM-MCNC: <25 NG/ML
TRAMADOL UR CFM-MCNC: <50 NG/ML
ZOLPIDEM UR CFM-MCNC: <25 NG/ML
ZOLPIDEM UR-MCNC: <25 NG/ML

## 2024-03-26 ENCOUNTER — PATIENT OUTREACH (OUTPATIENT)
Dept: PRIMARY CARE | Facility: CLINIC | Age: 74
End: 2024-03-26
Payer: MEDICARE

## 2024-03-26 NOTE — PROGRESS NOTES
Call placed regarding one month post discharge follow up call.  At time of outreach call the patient feels as if their condition has (improved) since initial visit with PCP or specialist.  Questions or concerns regarding recovery period addressed at this time. (Individualize as needed if questions arise)  Reviewed any PCP or specialists progress notes/labs/radiology reports if applicable and addressed any questions or concerns.     Ronnell Lerma LPN

## 2024-04-05 ENCOUNTER — LAB (OUTPATIENT)
Dept: LAB | Facility: LAB | Age: 74
End: 2024-04-05
Payer: MEDICARE

## 2024-04-05 ENCOUNTER — OFFICE VISIT (OUTPATIENT)
Dept: PRIMARY CARE | Facility: CLINIC | Age: 74
End: 2024-04-05
Payer: MEDICARE

## 2024-04-05 VITALS
BODY MASS INDEX: 36.4 KG/M2 | OXYGEN SATURATION: 98 % | WEIGHT: 185.4 LBS | TEMPERATURE: 97.5 F | HEART RATE: 77 BPM | SYSTOLIC BLOOD PRESSURE: 134 MMHG | HEIGHT: 60 IN | DIASTOLIC BLOOD PRESSURE: 74 MMHG

## 2024-04-05 DIAGNOSIS — G62.9 NEUROPATHY: ICD-10-CM

## 2024-04-05 DIAGNOSIS — E78.5 HYPERLIPIDEMIA, UNSPECIFIED HYPERLIPIDEMIA TYPE: ICD-10-CM

## 2024-04-05 DIAGNOSIS — I10 HYPERTENSION, ESSENTIAL: Primary | ICD-10-CM

## 2024-04-05 DIAGNOSIS — R60.0 LOCALIZED EDEMA: ICD-10-CM

## 2024-04-05 DIAGNOSIS — F39 MOOD DISORDER (CMS-HCC): ICD-10-CM

## 2024-04-05 DIAGNOSIS — R21 RASH: ICD-10-CM

## 2024-04-05 DIAGNOSIS — E66.9 CLASS 2 OBESITY WITH BODY MASS INDEX (BMI) OF 36.0 TO 36.9 IN ADULT, UNSPECIFIED OBESITY TYPE, UNSPECIFIED WHETHER SERIOUS COMORBIDITY PRESENT: ICD-10-CM

## 2024-04-05 DIAGNOSIS — K21.9 GASTROESOPHAGEAL REFLUX DISEASE WITHOUT ESOPHAGITIS: ICD-10-CM

## 2024-04-05 DIAGNOSIS — E78.2 MIXED HYPERLIPIDEMIA: ICD-10-CM

## 2024-04-05 DIAGNOSIS — R79.89 ABNORMAL CBC: ICD-10-CM

## 2024-04-05 LAB
ANION GAP SERPL CALC-SCNC: 11 MMOL/L (ref 10–20)
BASOPHILS # BLD AUTO: 0.07 X10*3/UL (ref 0–0.1)
BASOPHILS NFR BLD AUTO: 1.3 %
BUN SERPL-MCNC: 19 MG/DL (ref 6–23)
CALCIUM SERPL-MCNC: 9.7 MG/DL (ref 8.6–10.3)
CHLORIDE SERPL-SCNC: 104 MMOL/L (ref 98–107)
CO2 SERPL-SCNC: 29 MMOL/L (ref 21–32)
CREAT SERPL-MCNC: 0.62 MG/DL (ref 0.5–1.05)
EGFRCR SERPLBLD CKD-EPI 2021: >90 ML/MIN/1.73M*2
EOSINOPHIL # BLD AUTO: 0.08 X10*3/UL (ref 0–0.4)
EOSINOPHIL NFR BLD AUTO: 1.4 %
ERYTHROCYTE [DISTWIDTH] IN BLOOD BY AUTOMATED COUNT: 13.4 % (ref 11.5–14.5)
GLUCOSE SERPL-MCNC: 79 MG/DL (ref 74–99)
HCT VFR BLD AUTO: 38.9 % (ref 36–46)
HGB BLD-MCNC: 12.8 G/DL (ref 12–16)
IMM GRANULOCYTES # BLD AUTO: 0.01 X10*3/UL (ref 0–0.5)
IMM GRANULOCYTES NFR BLD AUTO: 0.2 % (ref 0–0.9)
LYMPHOCYTES # BLD AUTO: 1.29 X10*3/UL (ref 0.8–3)
LYMPHOCYTES NFR BLD AUTO: 23.3 %
MAGNESIUM SERPL-MCNC: 1.99 MG/DL (ref 1.6–2.4)
MCH RBC QN AUTO: 30.5 PG (ref 26–34)
MCHC RBC AUTO-ENTMCNC: 32.9 G/DL (ref 32–36)
MCV RBC AUTO: 93 FL (ref 80–100)
MONOCYTES # BLD AUTO: 0.5 X10*3/UL (ref 0.05–0.8)
MONOCYTES NFR BLD AUTO: 9 %
NEUTROPHILS # BLD AUTO: 3.58 X10*3/UL (ref 1.6–5.5)
NEUTROPHILS NFR BLD AUTO: 64.8 %
NRBC BLD-RTO: 0 /100 WBCS (ref 0–0)
PLATELET # BLD AUTO: 239 X10*3/UL (ref 150–450)
POTASSIUM SERPL-SCNC: 3.9 MMOL/L (ref 3.5–5.3)
RBC # BLD AUTO: 4.2 X10*6/UL (ref 4–5.2)
SODIUM SERPL-SCNC: 140 MMOL/L (ref 136–145)
WBC # BLD AUTO: 5.5 X10*3/UL (ref 4.4–11.3)

## 2024-04-05 PROCEDURE — 3078F DIAST BP <80 MM HG: CPT | Performed by: INTERNAL MEDICINE

## 2024-04-05 PROCEDURE — 1159F MED LIST DOCD IN RCRD: CPT | Performed by: INTERNAL MEDICINE

## 2024-04-05 PROCEDURE — 3075F SYST BP GE 130 - 139MM HG: CPT | Performed by: INTERNAL MEDICINE

## 2024-04-05 PROCEDURE — 1123F ACP DISCUSS/DSCN MKR DOCD: CPT | Performed by: INTERNAL MEDICINE

## 2024-04-05 PROCEDURE — 80048 BASIC METABOLIC PNL TOTAL CA: CPT

## 2024-04-05 PROCEDURE — 99214 OFFICE O/P EST MOD 30 MIN: CPT | Performed by: INTERNAL MEDICINE

## 2024-04-05 PROCEDURE — 85025 COMPLETE CBC W/AUTO DIFF WBC: CPT

## 2024-04-05 PROCEDURE — 36415 COLL VENOUS BLD VENIPUNCTURE: CPT

## 2024-04-05 PROCEDURE — 83735 ASSAY OF MAGNESIUM: CPT

## 2024-04-05 PROCEDURE — 3008F BODY MASS INDEX DOCD: CPT | Performed by: INTERNAL MEDICINE

## 2024-04-05 RX ORDER — AMLODIPINE BESYLATE 2.5 MG/1
2.5 TABLET ORAL DAILY
Qty: 90 TABLET | Refills: 3 | Status: SHIPPED | OUTPATIENT
Start: 2024-04-05 | End: 2024-04-10 | Stop reason: SDUPTHER

## 2024-04-05 RX ORDER — CALCIUM CARBONATE/VITAMIN D3 500-10/5ML
400 LIQUID (ML) ORAL DAILY
Qty: 30 CAPSULE | Refills: 0
Start: 2024-04-05

## 2024-04-05 RX ORDER — KETOCONAZOLE 20 MG/ML
SHAMPOO, SUSPENSION TOPICAL 2 TIMES WEEKLY
Qty: 120 ML | Refills: 0 | Status: SHIPPED | OUTPATIENT
Start: 2024-04-08 | End: 2024-04-10 | Stop reason: SDUPTHER

## 2024-04-05 RX ORDER — GABAPENTIN 100 MG/1
200 CAPSULE ORAL 3 TIMES DAILY
Qty: 180 CAPSULE | Refills: 3 | Status: SHIPPED | OUTPATIENT
Start: 2024-04-05 | End: 2025-04-05

## 2024-04-05 RX ORDER — OMEPRAZOLE 20 MG/1
20 CAPSULE, DELAYED RELEASE ORAL DAILY
Qty: 90 CAPSULE | Refills: 3 | Status: SHIPPED | OUTPATIENT
Start: 2024-04-05 | End: 2025-04-05

## 2024-04-05 ASSESSMENT — PATIENT HEALTH QUESTIONNAIRE - PHQ9
1. LITTLE INTEREST OR PLEASURE IN DOING THINGS: NOT AT ALL
2. FEELING DOWN, DEPRESSED OR HOPELESS: NOT AT ALL
SUM OF ALL RESPONSES TO PHQ9 QUESTIONS 1 AND 2: 0

## 2024-04-05 NOTE — PROGRESS NOTES
Subjective   Patient ID: Aileen Velasco is a 73 y.o. female who presents for primary care established.  HPI  72 yo with post herpetic nerve pain HTN, anxiety, migraines, and recent hospital admission for pericarditis, here for follow up. She says she is doing ok, still has some foot swelling. She feels like the foot swelling is better than before but she still has it. '  Was on higher dose of ca ch blocker   Now decreased for one week   Anxiety; Cymbalta, Xanax    Nerve pain: controlled on gabapentin and Cymbalta, however has rash on back agian    Migraines: Imitrex as needed, had to kacey it once in last couple of months, otherwise migraines pretty well controlled    Pericarditis: Saw cardiologist last Wednesday, stopped colchicine and indomethacin, no longer having chest pain     DLD: Taking pravastatin every night, no side effects    HTN: Amlodipine 2.5 mg, hydrochlorothiazide 25 mg, and Losartan 100 mg. Checks BP at home once a week, running in 130s systolics . Is in weight watcher    Health maintenance: UTD on Shingrix, last mammogram 2024      Review of Systems  Neg except per hpi    Objective   Physical Exam  Constitutional:       Appearance: Normal appearance.   HENT:      Mouth/Throat:      Mouth: Mucous membranes are moist.   Eyes:      Extraocular Movements: Extraocular movements intact.      Conjunctiva/sclera: Conjunctivae normal.   Cardiovascular:      Rate and Rhythm: Normal rate and regular rhythm.      Pulses: Normal pulses.      Heart sounds: Normal heart sounds. No murmur heard.  Pulmonary:      Effort: Pulmonary effort is normal.      Breath sounds: Normal breath sounds. No wheezing or rhonchi.   Abdominal:      General: There is no distension.      Palpations: Abdomen is soft.   Skin:     General: Skin is warm.      Capillary Refill: Capillary refill takes less than 2 seconds.   Neurological:      General: No focal deficit present.      Mental Status: She is alert and oriented to person, place,  and time. Mental status is at baseline.   Psychiatric:         Mood and Affect: Mood normal.     Bl pedal edema   Nl pulse  No redness   Faint red rash on lower back  One area of round blanching red area     Assessment/Plan     74 yo female with health maintenance, DLD, HTN, migraines here for follow up. Patient is doing well, no longer has pericarditis symptoms. Continue with current medications, up to date on medications.     #Health maintenance  - Last mammogram 2024    #DLD  - Continue with Pravastatin    #HTN  - C/w hydrochlorothiazide 25 mg, Losartan 100 mg, home bps better  - C/w amlodipine 2.5, was on 2.5 previous to leg swelling,     #Migraines  - Continue with Imitrex as needed    Aileen was seen today for primary care established.  Diagnoses and all orders for this visit:  Hypertension, essential (Primary)  -     amLODIPine (Norvasc) 2.5 mg tablet; Take 1 tablet (2.5 mg) by mouth once daily.  -     magnesium oxide 400 mg magnesium capsule; Take 1 capsule (400 mg) by mouth once daily.  Mood disorder (CMS/HCC)  Rash  -     ketoconazole (NIZOral) 2 % shampoo; Apply topically 2 times a week. Shampoo daily, leave on for 5-10 minutes, then rinse.  Mixed hyperlipidemia  Hyperlipidemia, unspecified hyperlipidemia type  Class 2 obesity with body mass index (BMI) of 36.0 to 36.9 in adult, unspecified obesity type, unspecified whether serious comorbidity present  -     Basic Metabolic Panel; Future  -     Magnesium; Future  Gastroesophageal reflux disease without esophagitis  -     omeprazole (PriLOSEC) 20 mg DR capsule; Take 1 capsule (20 mg) by mouth once daily.  Neuropathy  -     gabapentin (Neurontin) 100 mg capsule; Take 2 capsules (200 mg) by mouth 3 times a day.  Localized edema  Comments:  give this another week or two w decreased ca ch blocker  pt to call if not better  elevate legs  compression stockings    Chronic conditions reviewed in the assessment and plan.    Continue medications unless specified  otherwise.  Previous labs reviewed.   Other specialty provider notes reviewed.       Follow up in 3 months or prn.           Luz Maria Allen MD 04/08/24 8:21 AM

## 2024-04-08 ENCOUNTER — TELEPHONE (OUTPATIENT)
Dept: PRIMARY CARE | Facility: CLINIC | Age: 74
End: 2024-04-08
Payer: MEDICARE

## 2024-04-08 NOTE — TELEPHONE ENCOUNTER
----- Message from Luz Maria Allen MD sent at 4/8/2024  8:22 AM EDT -----  Ask her if she would like to see vasc med for feet swelling  I would say to do if not better in a week  If yes, put in referral

## 2024-04-10 DIAGNOSIS — F41.9 ANXIETY: ICD-10-CM

## 2024-04-10 DIAGNOSIS — R21 RASH: ICD-10-CM

## 2024-04-10 DIAGNOSIS — Z79.899 MEDICATION MANAGEMENT: ICD-10-CM

## 2024-04-10 DIAGNOSIS — I32 PERICARDITIS IN DISEASES CLASSIFIED ELSEWHERE (HHS-HCC): ICD-10-CM

## 2024-04-10 DIAGNOSIS — I10 HYPERTENSION, ESSENTIAL: ICD-10-CM

## 2024-04-10 PROBLEM — Z86.59 HISTORY OF DEPRESSION: Status: ACTIVE | Noted: 2024-04-10

## 2024-04-10 PROBLEM — S92.413A CLOSED FRACTURE OF PROXIMAL PHALANX OF GREAT TOE: Status: ACTIVE | Noted: 2023-04-05

## 2024-04-10 PROBLEM — S20.219A CONTUSION OF RIB: Status: ACTIVE | Noted: 2024-04-10

## 2024-04-10 PROBLEM — Z86.39 HISTORY OF ELEVATED LIPIDS: Status: ACTIVE | Noted: 2024-04-10

## 2024-04-10 PROBLEM — Z86.79 HISTORY OF HYPERTENSION: Status: ACTIVE | Noted: 2024-04-10

## 2024-04-10 RX ORDER — HYDROCHLOROTHIAZIDE 25 MG/1
25 TABLET ORAL DAILY
Qty: 90 TABLET | Refills: 3 | Status: SHIPPED | OUTPATIENT
Start: 2024-04-10 | End: 2025-04-10

## 2024-04-10 RX ORDER — AMLODIPINE BESYLATE 2.5 MG/1
2.5 TABLET ORAL DAILY
Qty: 90 TABLET | Refills: 3 | Status: SHIPPED | OUTPATIENT
Start: 2024-04-10 | End: 2024-04-11 | Stop reason: SDUPTHER

## 2024-04-10 RX ORDER — KETOCONAZOLE 20 MG/ML
SHAMPOO, SUSPENSION TOPICAL 2 TIMES WEEKLY
Qty: 120 ML | Refills: 0 | Status: SHIPPED | OUTPATIENT
Start: 2024-04-11

## 2024-04-11 DIAGNOSIS — I10 HYPERTENSION, ESSENTIAL: ICD-10-CM

## 2024-04-11 RX ORDER — AMLODIPINE BESYLATE 2.5 MG/1
2.5 TABLET ORAL DAILY
Qty: 90 TABLET | Refills: 3 | Status: SHIPPED | OUTPATIENT
Start: 2024-04-11

## 2024-05-13 DIAGNOSIS — I10 HYPERTENSION, ESSENTIAL: ICD-10-CM

## 2024-05-13 DIAGNOSIS — E78.2 MIXED HYPERLIPIDEMIA: ICD-10-CM

## 2024-05-13 DIAGNOSIS — F39 MOOD DISORDER (CMS-HCC): ICD-10-CM

## 2024-05-13 RX ORDER — DULOXETIN HYDROCHLORIDE 30 MG/1
30 CAPSULE, DELAYED RELEASE ORAL DAILY
Qty: 90 CAPSULE | Refills: 3 | Status: SHIPPED | OUTPATIENT
Start: 2024-05-13 | End: 2025-05-13

## 2024-05-13 RX ORDER — PRAVASTATIN SODIUM 20 MG/1
20 TABLET ORAL NIGHTLY
Qty: 90 TABLET | Refills: 3 | Status: SHIPPED | OUTPATIENT
Start: 2024-05-13 | End: 2025-05-13

## 2024-05-13 RX ORDER — LOSARTAN POTASSIUM 100 MG/1
100 TABLET ORAL DAILY
Qty: 90 TABLET | Refills: 3 | Status: SHIPPED | OUTPATIENT
Start: 2024-05-13 | End: 2025-05-13

## 2024-05-29 ENCOUNTER — PATIENT OUTREACH (OUTPATIENT)
Dept: PRIMARY CARE | Facility: CLINIC | Age: 74
End: 2024-05-29
Payer: MEDICARE

## 2024-05-29 NOTE — PROGRESS NOTES
Patient has met target of no readmission for (90) days post (hospital, SNF, rehab) discharge and is graduated from Transitional Care Management program at this time.    Ronnell Lerma LPN

## 2024-07-10 ENCOUNTER — APPOINTMENT (OUTPATIENT)
Dept: PRIMARY CARE | Facility: CLINIC | Age: 74
End: 2024-07-10
Payer: MEDICARE

## 2024-07-10 VITALS
SYSTOLIC BLOOD PRESSURE: 137 MMHG | DIASTOLIC BLOOD PRESSURE: 75 MMHG | HEART RATE: 79 BPM | WEIGHT: 186 LBS | HEIGHT: 60 IN | OXYGEN SATURATION: 96 % | TEMPERATURE: 97.3 F | BODY MASS INDEX: 36.52 KG/M2

## 2024-07-10 DIAGNOSIS — E55.9 VITAMIN D DEFICIENCY: ICD-10-CM

## 2024-07-10 DIAGNOSIS — E78.2 HYPERLIPEMIA, MIXED: ICD-10-CM

## 2024-07-10 DIAGNOSIS — F41.9 ANXIETY: Primary | ICD-10-CM

## 2024-07-10 DIAGNOSIS — R53.1 WEAKNESS: ICD-10-CM

## 2024-07-10 DIAGNOSIS — R73.9 ELEVATED BLOOD SUGAR: ICD-10-CM

## 2024-07-10 DIAGNOSIS — R41.3 MEMORY CHANGES: ICD-10-CM

## 2024-07-10 DIAGNOSIS — G62.9 NEUROPATHY: ICD-10-CM

## 2024-07-10 DIAGNOSIS — K21.9 GASTROESOPHAGEAL REFLUX DISEASE WITHOUT ESOPHAGITIS: ICD-10-CM

## 2024-07-10 PROCEDURE — 3008F BODY MASS INDEX DOCD: CPT | Performed by: INTERNAL MEDICINE

## 2024-07-10 PROCEDURE — 1036F TOBACCO NON-USER: CPT | Performed by: INTERNAL MEDICINE

## 2024-07-10 PROCEDURE — 1123F ACP DISCUSS/DSCN MKR DOCD: CPT | Performed by: INTERNAL MEDICINE

## 2024-07-10 PROCEDURE — 3078F DIAST BP <80 MM HG: CPT | Performed by: INTERNAL MEDICINE

## 2024-07-10 PROCEDURE — 1159F MED LIST DOCD IN RCRD: CPT | Performed by: INTERNAL MEDICINE

## 2024-07-10 PROCEDURE — 1158F ADVNC CARE PLAN TLK DOCD: CPT | Performed by: INTERNAL MEDICINE

## 2024-07-10 PROCEDURE — 3075F SYST BP GE 130 - 139MM HG: CPT | Performed by: INTERNAL MEDICINE

## 2024-07-10 PROCEDURE — 99214 OFFICE O/P EST MOD 30 MIN: CPT | Performed by: INTERNAL MEDICINE

## 2024-07-10 PROCEDURE — G2211 COMPLEX E/M VISIT ADD ON: HCPCS | Performed by: INTERNAL MEDICINE

## 2024-07-10 RX ORDER — ALPRAZOLAM 0.25 MG/1
0.25 TABLET ORAL 2 TIMES DAILY PRN
Qty: 60 TABLET | Refills: 2 | Status: SHIPPED | OUTPATIENT
Start: 2024-07-10

## 2024-07-10 RX ORDER — GABAPENTIN 100 MG/1
100 CAPSULE ORAL 2 TIMES DAILY
Qty: 180 CAPSULE | Refills: 3 | Status: SHIPPED | OUTPATIENT
Start: 2024-07-10 | End: 2025-07-10

## 2024-07-10 RX ORDER — OMEPRAZOLE 20 MG/1
20 CAPSULE, DELAYED RELEASE ORAL DAILY
Qty: 90 CAPSULE | Refills: 3 | Status: SHIPPED | OUTPATIENT
Start: 2024-07-10 | End: 2025-07-10

## 2024-07-10 ASSESSMENT — PATIENT HEALTH QUESTIONNAIRE - PHQ9
10. IF YOU CHECKED OFF ANY PROBLEMS, HOW DIFFICULT HAVE THESE PROBLEMS MADE IT FOR YOU TO DO YOUR WORK, TAKE CARE OF THINGS AT HOME, OR GET ALONG WITH OTHER PEOPLE: SOMEWHAT DIFFICULT
2. FEELING DOWN, DEPRESSED OR HOPELESS: SEVERAL DAYS
SUM OF ALL RESPONSES TO PHQ9 QUESTIONS 1 AND 2: 2
1. LITTLE INTEREST OR PLEASURE IN DOING THINGS: SEVERAL DAYS

## 2024-07-10 ASSESSMENT — ANXIETY QUESTIONNAIRES
7. FEELING AFRAID AS IF SOMETHING AWFUL MIGHT HAPPEN: NOT AT ALL
GAD7 TOTAL SCORE: 5
2. NOT BEING ABLE TO STOP OR CONTROL WORRYING: NOT AT ALL
IF YOU CHECKED OFF ANY PROBLEMS ON THIS QUESTIONNAIRE, HOW DIFFICULT HAVE THESE PROBLEMS MADE IT FOR YOU TO DO YOUR WORK, TAKE CARE OF THINGS AT HOME, OR GET ALONG WITH OTHER PEOPLE: NOT DIFFICULT AT ALL
4. TROUBLE RELAXING: NOT AT ALL
5. BEING SO RESTLESS THAT IT IS HARD TO SIT STILL: NOT AT ALL
1. FEELING NERVOUS, ANXIOUS, OR ON EDGE: MORE THAN HALF THE DAYS
6. BECOMING EASILY ANNOYED OR IRRITABLE: SEVERAL DAYS
3. WORRYING TOO MUCH ABOUT DIFFERENT THINGS: MORE THAN HALF THE DAYS

## 2024-07-10 NOTE — PROGRESS NOTES
Trouble remembering   Forgeting afternoon gabapentin doing 3 in am  Very stressed right now  Needs xanax once per day at least  Taking care of sister  Concern w   Weakness in legs  Worsening somewhat  Has not seen since 2017  Dr george Linares feels  ok  Pt for elbow helped her w cane  Back is shifting  Left hip cannot be helped  Neuro  Dr sen 4 years has not seen   Has not seen either  Memory is worsening pt feels   Itches and rash since being in the sun   One time had r side pain had for a few hours then gone  Sister lives 5 min away  From her  After sun early June went to [facility] head      Controlled Substance Visit:  I have personally reviewed the [place] report and have considered the risks of abuse, dependence, addiction and diversion and I believe that it is clinically appropriate for the patient to be prescribed this medication.    Is the patient prescribed a combination of a benzodiazepine and opioid?  No    Last Urine Drug Screen / ordered today: No  Recent Results (from the past 8760 hour(s))   Confirmation Opiate/Opioid/Benzo Prescription Compliance    Collection Time: 03/06/24 10:13 AM   Result Value Ref Range    Clonazepam <25 <25 ng/mL    7-Aminoclonazepam <25 <25 ng/mL    Alprazolam 32 (H) <25 ng/mL    Alpha-Hydroxyalprazolam 135 (H) <25 ng/mL    Midazolam <25 <25 ng/mL    Alpha-Hydroxymidazolam <25 <25 ng/mL    Chlordiazepoxide <25 <25 ng/mL    Diazepam <25 <25 ng/mL    Nordiazepam <25 <25 ng/mL    Temazepam <25 <25 ng/mL    Oxazepam <25 <25 ng/mL    Lorazepam <25 <25 ng/mL    Methadone <25 <25 ng/mL    EDDP <25 <25 ng/mL    6-Acetylmorphine <25 <25 ng/mL    Codeine <50 <50 ng/mL    Hydrocodone <25 <25 ng/mL    Hydromorphone <25 <25 ng/mL    Morphine  <50 <50 ng/mL    Norhydrocodone <25 <25 ng/mL    Noroxycodone <25 <25 ng/mL    Oxycodone <25 <25 ng/mL    Oxymorphone <25 <25 ng/mL    Fentanyl <2.5 <2.5 ng/mL    Norfentanyl <2.5 <2.5 ng/mL    Tramadol <50 <50 ng/mL    O-Desmethyltramadol <50 <50  ng/mL    Zolpidem <25 <25 ng/mL    Zolpidem Metabolite (ZCA) <25 <25 ng/mL   Screen Opiate/Opioid/Benzo Prescription Compliance    Collection Time: 24 10:13 AM   Result Value Ref Range    Creatinine, Urine Random 106.8 20.0 - 320.0 mg/dL    Amphetamine Screen, Urine Presumptive Negative Presumptive Negative    Barbiturate Screen, Urine Presumptive Negative Presumptive Negative    Cannabinoid Screen, Urine Presumptive Negative Presumptive Negative    Cocaine Metabolite Screen, Urine Presumptive Negative Presumptive Negative    PCP Screen, Urine Presumptive Negative Presumptive Negative     Results are as expected.     Controlled Substance Agreement:  Date of the Last Agreement: 2024  I have reviewed each line item on the Controlled Substance Agreement including, but not limited to, the benefits, risks, and alternatives to treatment with a Controlled Substance medication(s). The patient has verbalized understanding and signed the agreement.    Benzodiazepines:  What is the patient's goal of therapy? anxiety  Is this being achieved with current treatment? yes    LOC-7:  Over the last 2 weeks, how often have you been bothered by any of the following problems?  Feeling nervous, anxious, or on edge: 2  Not being able to stop or control worryin  Worrying too much about different things: 2  Trouble relaxin  Being so restless that it is hard to sit still: 0  Becoming easily annoyed or irritable: 1  Feeling afraid as if something awful might happen: 0  LOC-7 Total Score: 5        Activities of Daily Living:   Is your overall impression that this patient is benefiting (symptom reduction outweighs side effects) from benzodiazepine therapy? Yes     1. Physical Functioning: Better  2. Family Relationship: Better  3. Social Relationship: Better  4. Mood: Better  5. Sleep Patterns: Better  6. Overall Function: Better    Gen: Alert, NAD  HEENT:  PERRL  conjunctiva and sclera normal in appearance; Neck  supple  Respiratory:  Lungs CTAB  Cardiovascular:  Heart RRR. No M/R/G  Abdomen: soft, nontender, nondistended   Neuro:  Gross motor and sensory intact  Skin:  No suspicious lesions present   Mood: normal    Severe scoliosis   nontender r side        Aileen was seen today for follow-up.  Diagnoses and all orders for this visit:  Anxiety (Primary)  -     ALPRAZolam (Xanax) 0.25 mg tablet; Take 1 tablet (0.25 mg) by mouth 2 times a day as needed for anxiety.  -     Hemoglobin A1C; Future  -     Comprehensive Metabolic Panel; Future  -     TSH with reflex to Free T4 if abnormal; Future  -     Vitamin B12; Future  -     Lipid Panel; Future  -     CBC and Auto Differential; Future  Neuropathy  -     gabapentin (Neurontin) 100 mg capsule; Take 1 capsule (100 mg) by mouth 2 times a day. 3 in am 2-3 in evening  -     Hemoglobin A1C; Future  -     Comprehensive Metabolic Panel; Future  -     TSH with reflex to Free T4 if abnormal; Future  -     Vitamin B12; Future  -     Lipid Panel; Future  -     CBC and Auto Differential; Future  Weakness  Comments:  possibly due to back scoliosis  Orders:  -     Hemoglobin A1C; Future  -     Comprehensive Metabolic Panel; Future  -     TSH with reflex to Free T4 if abnormal; Future  -     Vitamin B12; Future  -     Lipid Panel; Future  -     CBC and Auto Differential; Future  Memory changes  -     MR brain wo IV contrast; Future  -     Hemoglobin A1C; Future  -     Comprehensive Metabolic Panel; Future  -     TSH with reflex to Free T4 if abnormal; Future  -     Vitamin B12; Future  -     Lipid Panel; Future  -     CBC and Auto Differential; Future  Gastroesophageal reflux disease without esophagitis  -     omeprazole (PriLOSEC) 20 mg DR capsule; Take 1 capsule (20 mg) by mouth once daily. 1-2 daily  -     Hemoglobin A1C; Future  -     Comprehensive Metabolic Panel; Future  -     TSH with reflex to Free T4 if abnormal; Future  -     Vitamin B12; Future  -     Lipid Panel; Future  -      CBC and Auto Differential; Future  Vitamin D deficiency  -     Hemoglobin A1C; Future  -     Comprehensive Metabolic Panel; Future  -     TSH with reflex to Free T4 if abnormal; Future  -     Vitamin D 25-Hydroxy,Total (for eval of Vitamin D levels); Future  -     Vitamin B12; Future  -     Lipid Panel; Future  -     CBC and Auto Differential; Future  Elevated blood sugar  -     Hemoglobin A1C; Future  Hyperlipemia, mixed  -     Lipid Panel; Future  Reviewed dr zaman note  Medications and stress adding to sx   Pt to follow up w dr pantoja  Chronic conditions reviewed in the assessment and plan.    Continue medications unless specified otherwise.  Previous labs reviewed.   Other specialty provider notes reviewed.   Anxiety likely adding to memory issues , stress of  and sister to care for   Follow up in 3 months or prn.

## 2024-07-22 ENCOUNTER — HOSPITAL ENCOUNTER (OUTPATIENT)
Dept: RADIOLOGY | Facility: CLINIC | Age: 74
Discharge: HOME | End: 2024-07-22
Payer: MEDICARE

## 2024-07-22 DIAGNOSIS — R41.3 MEMORY CHANGES: ICD-10-CM

## 2024-07-22 PROCEDURE — 70551 MRI BRAIN STEM W/O DYE: CPT | Performed by: RADIOLOGY

## 2024-07-22 PROCEDURE — 70551 MRI BRAIN STEM W/O DYE: CPT

## 2024-07-26 ENCOUNTER — APPOINTMENT (OUTPATIENT)
Dept: RADIOLOGY | Facility: CLINIC | Age: 74
End: 2024-07-26
Payer: MEDICARE

## 2024-09-09 ENCOUNTER — HOSPITAL ENCOUNTER (OUTPATIENT)
Dept: RADIOLOGY | Facility: CLINIC | Age: 74
Discharge: HOME | End: 2024-09-09
Payer: MEDICARE

## 2024-09-09 ENCOUNTER — APPOINTMENT (OUTPATIENT)
Dept: PRIMARY CARE | Facility: CLINIC | Age: 74
End: 2024-09-09
Payer: MEDICARE

## 2024-09-09 VITALS
BODY MASS INDEX: 35.74 KG/M2 | DIASTOLIC BLOOD PRESSURE: 83 MMHG | SYSTOLIC BLOOD PRESSURE: 146 MMHG | WEIGHT: 183 LBS | OXYGEN SATURATION: 96 % | TEMPERATURE: 97.7 F | HEART RATE: 78 BPM

## 2024-09-09 DIAGNOSIS — J45.40 MODERATE PERSISTENT ASTHMA WITHOUT COMPLICATION (HHS-HCC): ICD-10-CM

## 2024-09-09 DIAGNOSIS — E78.2 MIXED HYPERLIPIDEMIA: ICD-10-CM

## 2024-09-09 DIAGNOSIS — M25.571 ACUTE RIGHT ANKLE PAIN: ICD-10-CM

## 2024-09-09 DIAGNOSIS — E78.2 HYPERLIPEMIA, MIXED: ICD-10-CM

## 2024-09-09 DIAGNOSIS — E55.9 VITAMIN D DEFICIENCY: ICD-10-CM

## 2024-09-09 DIAGNOSIS — R73.9 ELEVATED BLOOD SUGAR: ICD-10-CM

## 2024-09-09 DIAGNOSIS — G62.9 NEUROPATHY: Primary | ICD-10-CM

## 2024-09-09 DIAGNOSIS — F39 MOOD DISORDER (CMS-HCC): ICD-10-CM

## 2024-09-09 DIAGNOSIS — Z00.00 WELL ADULT EXAM: ICD-10-CM

## 2024-09-09 PROBLEM — E66.811 OBESITY, CLASS I, BMI 30-34.9: Status: RESOLVED | Noted: 2019-01-06 | Resolved: 2024-09-09

## 2024-09-09 PROBLEM — I32: Status: RESOLVED | Noted: 2024-02-25 | Resolved: 2024-09-09

## 2024-09-09 PROBLEM — J45.909 REACTIVE AIRWAY DISEASE (HHS-HCC): Status: RESOLVED | Noted: 2023-04-05 | Resolved: 2024-09-09

## 2024-09-09 PROBLEM — R10.9 ABDOMINAL DISCOMFORT: Status: RESOLVED | Noted: 2023-04-05 | Resolved: 2024-09-09

## 2024-09-09 PROBLEM — E66.9 OBESITY, CLASS I, BMI 30-34.9: Status: RESOLVED | Noted: 2019-01-06 | Resolved: 2024-09-09

## 2024-09-09 PROCEDURE — 73610 X-RAY EXAM OF ANKLE: CPT | Mod: RT

## 2024-09-09 PROCEDURE — 73610 X-RAY EXAM OF ANKLE: CPT | Mod: RIGHT SIDE | Performed by: RADIOLOGY

## 2024-09-09 PROCEDURE — 73630 X-RAY EXAM OF FOOT: CPT | Mod: RT

## 2024-09-09 PROCEDURE — G0444 DEPRESSION SCREEN ANNUAL: HCPCS | Performed by: INTERNAL MEDICINE

## 2024-09-09 PROCEDURE — 1158F ADVNC CARE PLAN TLK DOCD: CPT | Performed by: INTERNAL MEDICINE

## 2024-09-09 PROCEDURE — 99214 OFFICE O/P EST MOD 30 MIN: CPT | Performed by: INTERNAL MEDICINE

## 2024-09-09 PROCEDURE — 1159F MED LIST DOCD IN RCRD: CPT | Performed by: INTERNAL MEDICINE

## 2024-09-09 PROCEDURE — 3079F DIAST BP 80-89 MM HG: CPT | Performed by: INTERNAL MEDICINE

## 2024-09-09 PROCEDURE — 3077F SYST BP >= 140 MM HG: CPT | Performed by: INTERNAL MEDICINE

## 2024-09-09 PROCEDURE — G0439 PPPS, SUBSEQ VISIT: HCPCS | Performed by: INTERNAL MEDICINE

## 2024-09-09 PROCEDURE — 1123F ACP DISCUSS/DSCN MKR DOCD: CPT | Performed by: INTERNAL MEDICINE

## 2024-09-09 PROCEDURE — 1036F TOBACCO NON-USER: CPT | Performed by: INTERNAL MEDICINE

## 2024-09-09 RX ORDER — DULOXETIN HYDROCHLORIDE 30 MG/1
30 CAPSULE, DELAYED RELEASE ORAL 2 TIMES DAILY
Qty: 180 CAPSULE | Refills: 3 | Status: SHIPPED | OUTPATIENT
Start: 2024-09-09 | End: 2025-09-09

## 2024-09-09 RX ORDER — GABAPENTIN 300 MG/1
300 CAPSULE ORAL 3 TIMES DAILY
Qty: 90 CAPSULE | Refills: 5 | Status: SHIPPED | OUTPATIENT
Start: 2024-09-09 | End: 2025-03-08

## 2024-09-09 ASSESSMENT — PATIENT HEALTH QUESTIONNAIRE - PHQ9
SUM OF ALL RESPONSES TO PHQ9 QUESTIONS 1 AND 2: 2
2. FEELING DOWN, DEPRESSED OR HOPELESS: SEVERAL DAYS
1. LITTLE INTEREST OR PLEASURE IN DOING THINGS: SEVERAL DAYS
10. IF YOU CHECKED OFF ANY PROBLEMS, HOW DIFFICULT HAVE THESE PROBLEMS MADE IT FOR YOU TO DO YOUR WORK, TAKE CARE OF THINGS AT HOME, OR GET ALONG WITH OTHER PEOPLE: SOMEWHAT DIFFICULT

## 2024-09-09 NOTE — PROGRESS NOTES
Chief Complaint:   Medicare Wellness Exam/Comprehensive Problem Focused Follow Up and Physical Exam    HPI:    Needs med for depression   needs knee replacement  Sister a lot of work  Cancelled foot appt  Podiatrist is out  R ankle pain  Has stepped on it w cane  Occ stomach upset  Will call gi doc   Dr vazquez needs appt  Wants us to order ct or mri    Patient Care Team:  Luz Maria Allen MD as PCP - General  Luz Maria Allen MD as PCP - [facility] Attributed Provider   Active Problem List  Patient Active Problem List   Diagnosis    Allergic rhinitis    Ankle pain, right    Anxiety    BPV (benign positional vertigo)    Chronic bilateral low back pain without sciatica    Complaint of memory disorder without observed objective memory deficit    Fatigue    Elevated blood sugar    Hypertension, essential    ETD (eustachian tube dysfunction)    GERD (gastroesophageal reflux disease)    Hiatal hernia    Hematuria    Hot flashes    Hypokalemia    Impaired memory    Mixed hyperlipidemia    Mood disorder (CMS-HCC)    Narcolepsy without cataplexy (HHS-HCC)    Neuropathy    Osteoarthritis    Obstructive sleep apnea    TMJ arthritis    Vitamin D deficiency    Medication management    Atrophic vaginitis    Female bladder prolapse    Lung nodules    Migraine    Moderate persistent asthma without complication (HHS-HCC)    Pain in limb    Polyp of colon    Polyp of vagina    Right elbow pain    Urinary frequency    Vulvar varicose veins    Acute viral pericarditis (HHS-HCC)    Chest pain    Elevated d-dimer    Closed fracture of proximal phalanx of great toe    Contusion of rib    History of depression    History of elevated lipids    History of hypertension         Comprehensive Medical/Surgical/Social/Family History  Past Medical History:   Diagnosis Date    Body mass index (BMI) 35.0-35.9, adult 10/15/2020    Body mass index (BMI) of 35.0 to 35.9 in adult    Migraine, unspecified, not intractable, without status  migrainosus     Migraine    Other postherpetic nervous system involvement     PHN (postherpetic neuralgia)    Other symptoms and signs involving the nervous system 10/14/2019    Suspected sleep apnea    Personal history of other diseases of the circulatory system     History of varicose veins of lower extremity    Personal history of other diseases of the circulatory system     History of hypertension    Personal history of other diseases of the digestive system     History of gastroesophageal reflux (GERD)    Personal history of other diseases of the musculoskeletal system and connective tissue     History of fibromyalgia    Personal history of other diseases of the musculoskeletal system and connective tissue     History of osteopenia    Personal history of other diseases of the respiratory system     History of asthma    Personal history of other endocrine, nutritional and metabolic disease     History of hyperlipidemia    Personal history of other mental and behavioral disorders     History of depression     Past Surgical History:   Procedure Laterality Date    CHOLECYSTECTOMY  08/17/2018    Cholecystectomy    HERNIA REPAIR  08/17/2018    Inguinal Hernia Repair    HYSTERECTOMY  08/17/2018    Hysterectomy    OTHER SURGICAL HISTORY  08/17/2018    Hysteroscopy With Resection For Intrauterine Polyp Removal    OTHER SURGICAL HISTORY  11/17/2020    Hernia repair    OTHER SURGICAL HISTORY  11/17/2020    Lower back surgery    OTHER SURGICAL HISTORY  10/11/2019    Colonoscopy     Social History     Social History Narrative    Not on file        Tobacco/Alcohol/Opioid use, as well as Illicit Drug Use was screened for/reviewed and documented in Social Documentation section of the chart and medication list as appropriate   (~5min spent discussing the above)    Allergies and Medications  Atorvastatin, Pregabalin, and Statins-hmg-coa reductase inhibitors  Current Outpatient Medications   Medication Instructions    albuterol  90 mcg/actuation inhaler 2 puffs, inhalation, Every 4 hours PRN    ALPRAZolam (XANAX) 0.25 mg, oral, 2 times daily PRN    amLODIPine (NORVASC) 2.5 mg, oral, Daily    ascorbic acid, vitamin C, 500 mg capsule 1 capsule, oral, Daily    biotin 1 mg tablet Take as directed    calcium carbonate-vitamin D3 500 mg-5 mcg (200 unit) tablet 2 tablets, oral, Daily    celecoxib (CELEBREX) 100 mg, oral, As needed    chlorhexidine (Peridex) 0.12 % solution RINSE MOUTH WITH 15 ML (1 CAPFUL) FOR 30 SECONDS AM AND PM AFTER TOOTHBRUSHING. EXPECTORATE AFTER RINSING. DO NOT SWALLOW PRN.    cranberry conc-ascorbic acid 12,600-20 mg capsule 1 capsule, oral, Daily    DULoxetine (CYMBALTA) 30 mg, oral, 2 times daily    fluticasone (Flonase) 50 mcg/actuation nasal spray 2 sprays, Each Nostril, Daily    gabapentin (NEURONTIN) 300 mg, oral, 3 times daily    hydroCHLOROthiazide (HYDRODIURIL) 25 mg, oral, Daily    ketoconazole (NIZOral) 2 % shampoo Topical, 2 times weekly, Shampoo daily, leave on for 5-10 minutes, then rinse.    L. acidophilus/Bifid. animalis (DAILY PROBIOTIC ORAL) Use as directed    loratadine (Claritin) 10 mg tablet 1 tablet, oral, Daily PRN    losartan (COZAAR) 100 mg, oral, Daily    magnesium oxide 400 mg, oral, Daily    NON FORMULARY Vitamin D 50 MCG (2000 UT) Oral Capsule; TAKE 2 CAPSULES Daily    NON FORMULARY PRO AIR HFA     nystatin-triamcinolone (Mycolog II) cream Topical, 2 times daily, Apply to affect area twice a day for 7-14 days then as needed for rash.    omega 3-dha-epa-fish oil 1,000 mg (120 mg-180 mg) capsule 1 capsule, oral, Daily    omeprazole (PRILOSEC) 20 mg, oral, Daily, 1-2 daily    PAPAYA ENZYME ORAL 3 tablets, oral, Daily    parsley/garlic (GARLIC-PARSLEY ORAL) 2 times daily    polyethylene glycol (Glycolax) 17 gram/dose powder As needed, MIX 1 PACKET IN 8 OUNCES OF LIQUID AND DRINK EOD    potassium chloride CR (Klor-Con) 8 mEq ER tablet 8 mEq, oral, Daily, Do not crush, chew, or split.    pravastatin  "(PRAVACHOL) 20 mg, oral, Nightly    SUMAtriptan (IMITREX) 50 mg, oral, Once as needed, TAKE 1 TABLET AT ONSET OF HEADACHE. CAN TAKE ANOTHER DOSE IN 2 HOURS IF HEADACHE CONTINUES.    turmeric root extract 500 mg tablet 1 tablet, oral, Daily    VITAMIN B COMPLEX-FOLIC ACID ORAL 1 tablet, oral, Daily     Medications and Supplements  prescribed by me and other practitioners or clinical pharmacist (such as prescriptions, OTC's, herbal therapies and supplements) were reviewed and documented in the medical record.      Activities of Daily Living  In your present state of health, do you have any difficulty performing the following activities?:   Preparing food and eating?: No  Bathing yourself: No  Getting dressed: No  Using the toilet:No  Moving around from place to place: No  In the past year have you fallen or had a near fall?:No  Able to manage finances independently: Yes  Able to perform grocery shopping: Yes  Able to manage medications independently: Yes  Able to do housework independently: Yes  Patient self-assessment of health status? Good    Depression Screen  (Note: if answer to either of the following is \"Yes\", then a more complete depression screening is indicated)   Q1: Over the past two weeks, have you felt down, depressed or hopeless? Yes   Q2: Over the past two weeks, have you felt little interest or pleasure in doing things? Yes    Current exercise habits: None   Dietary issues discussed: Yes  Hearing difficulties: No  Safe in current home environment: Yes  Visual Acuity assessed: No  Cognitive Impairment No    Advance directives  Advanced Care Planning (including a Living Will, Healthcare POA, as well as specific end of life choices and/or directives), was discussed with the patient and/or surrogate, voluntarily, and documented in the Problem List of the medical record.     Then dtr leann  Cardiac Risk Assessment  Cardiovascular risk was discussed and, if needed, lifestyle modifications recommended, " including nutritional choices, exercise, and elimination of habits contributing to risk. We agreed on a plan to reduce the current cardiovascular risk based on above discussion as needed.  Aspirin use/disuse was discussed and documented in the Problem List of the medical record after reviewing the updated guidelines below:    Consider low dose Aspirin ( mg) use if the benefit for cardiovascular disease prevention outweighs risk for bleeding complications.   In general, low dose ASA should be considered:  In patients WITHOUT prior MI/stroke/PAD (primary prevention):   a. Age <60: Use if 10-year cardiovascular disease risk >20%, with discussion of risks and benefits with patient  b. Age 60-<70: Use if 10-year cardiovascular disease risk >20% and low bleeding (e.g., gastrointenstinal) risk, with discussion of risks and benefits with patient  c. Age >=70: Do not use    In patients WITH prior MI/stroke/PAD (secondary prevention):   Generally use unless extremely high bleeding (e.g., gastrointenstinal) risk, with discussion of risks and benefits with patient        ROS otherwise negative aside from what was mentioned above in HPI.    Gen:  no fever  HEENT:  no trouble swallowing  CV:  no dyspnea, cyanosis  Lungs:  no shortness of breath  GI:  no constipation, no blood in stool  Vascular:  no edema  Neuro:   no new weakness  Skin:  no rash  MS:no joint swelling  Gu:  no urinary complaints  All other systems have been reviewed and are negative for complaint    Vitals  /83   Pulse 78   Temp 36.5 °C (97.7 °F) (Temporal)   Wt 83 kg (183 lb)   SpO2 96%   BMI 35.74 kg/m²   Body mass index is 35.74 kg/m².  Objective   Physical Exam  General Appearance:  Alert and oriented.  NAD  HEENT:  Tm's normal , throat clear, no erythema  Lungs, CTAB  Skin:  no suspicious lesions,  warm and dry  Head :  Normocephalic  Oral Cavity; Clear mucosa moist  Neck/thyroid:  neck supple, full rom, no cervical lymphadenopathy  no  thyromegaly  Heart:  RRR  no murmurs  Abdomen:  Normal , bs present, soft, nontender, not distended, no masses palpated  Extremities:  No clubbing, cyanosis, or edema  Neurologic:  Nonfocal  Psych: alert, normal mood  Mild tenderness lateral r ankle nl pulse  No edema   During the course of the visit the patient was educated and counseled about age appropriate screening and preventive services. Completed preventive screenings were documented in the chart and orders were placed for outstanding screenings/procedures as documented in the Assessment and Plan.    Patient Instructions (the written plan) was given to the patient at check out.     Problem List Items Addressed This Visit       Ankle pain, right    Relevant Orders    XR ankle right 3+ views    XR foot right 3+ views    Hemoglobin A1C    Comprehensive Metabolic Panel    TSH with reflex to Free T4 if abnormal    Vitamin D 25-Hydroxy,Total (for eval of Vitamin D levels)    Vitamin B12    Lipid Panel    CBC and Auto Differential    Elevated blood sugar    Overview     10/23 HA1C = 5.7  Continue to monitor with this slight increase from 5.3         Relevant Orders    Hemoglobin A1C    Mixed hyperlipidemia    Mood disorder (CMS-HCC)    Overview     Stable condition. Follow up at least yearly. Continue current medications.  2/1/22: inc buspar.Stable condition follow up at least  yearly.           Relevant Medications    DULoxetine (Cymbalta) 30 mg DR capsule    Other Relevant Orders    Hemoglobin A1C    Comprehensive Metabolic Panel    TSH with reflex to Free T4 if abnormal    Vitamin D 25-Hydroxy,Total (for eval of Vitamin D levels)    Vitamin B12    Lipid Panel    CBC and Auto Differential    Neuropathy - Primary    Overview       Inc gabapentin to 300 tid prn          Relevant Medications    gabapentin (Neurontin) 300 mg capsule    Other Relevant Orders    Hemoglobin A1C    Comprehensive Metabolic Panel    TSH with reflex to Free T4 if abnormal    Vitamin D  25-Hydroxy,Total (for eval of Vitamin D levels)    Vitamin B12    Lipid Panel    CBC and Auto Differential    Vitamin D deficiency    Overview     10/23 labs stable  Cont supplement          Relevant Orders    Vitamin D 25-Hydroxy,Total (for eval of Vitamin D levels)    Moderate persistent asthma without complication (HHS-HCC)    Overview     Continue current medications.   No new symptoms,stable condition.   Follow up at least yearly.            Other Visit Diagnoses       Well adult exam        Relevant Orders    Hemoglobin A1C    Comprehensive Metabolic Panel    TSH with reflex to Free T4 if abnormal    Vitamin D 25-Hydroxy,Total (for eval of Vitamin D levels)    Vitamin B12    Lipid Panel    CBC and Auto Differential    Hyperlipemia, mixed        Relevant Orders    Lipid Panel           Chronic conditions reviewed in the assessment and plan.    Continue medications unless specified otherwise.  Previous labs reviewed.   Other specialty provider notes reviewed.   Follow up in 3 months or prn.      Annual Wellness exam completed   Preventive Health history reviewed:  Vaccines today: none  Labs ordered    Depression Screening done  Advanced Directives Discussion Completed  Cardiovascular risk discussed and if needed, lifestyle modifications recommended, including nutritional choices, exercise, and elimination of habits contributing to risk.  We agreed on a plan to reduce the current cardiovascular risk.  See ecalc ASCVD Risk  Plus for data discussed regarding risk and risk reduction opportunities.  5 minutes spent discussing this.  Aspirin use/disuse was discussed after reviewing the updated guidelines.      5 min spent discussing depression screening

## 2024-10-09 DIAGNOSIS — K21.9 GASTROESOPHAGEAL REFLUX DISEASE WITHOUT ESOPHAGITIS: ICD-10-CM

## 2024-10-09 RX ORDER — OMEPRAZOLE 20 MG/1
CAPSULE, DELAYED RELEASE ORAL
Qty: 90 CAPSULE | Refills: 3 | Status: SHIPPED | OUTPATIENT
Start: 2024-10-09

## 2024-10-29 DIAGNOSIS — F41.9 ANXIETY: ICD-10-CM

## 2024-10-29 NOTE — TELEPHONE ENCOUNTER
Spoke with patient early this morning. She was confused about the refill request. Stated she did not request the refill. I explained she is out of compliance for the medication and would need to schedule a [facility]. Attempted to schedule appointment at that time, offered vv, she declined. Stated she wanted to go home to count how many she had left of the prescription and then she would call me back. I called again, left vm.

## 2024-10-30 ENCOUNTER — TELEMEDICINE (OUTPATIENT)
Dept: PRIMARY CARE | Facility: CLINIC | Age: 74
End: 2024-10-30
Payer: MEDICARE

## 2024-10-30 DIAGNOSIS — Z79.899 MEDICATION MANAGEMENT: ICD-10-CM

## 2024-10-30 DIAGNOSIS — F41.9 ANXIETY: Primary | ICD-10-CM

## 2024-10-30 PROCEDURE — 99213 OFFICE O/P EST LOW 20 MIN: CPT | Performed by: NURSE PRACTITIONER

## 2024-10-30 PROCEDURE — G2211 COMPLEX E/M VISIT ADD ON: HCPCS | Performed by: NURSE PRACTITIONER

## 2024-10-30 PROCEDURE — 1159F MED LIST DOCD IN RCRD: CPT | Performed by: NURSE PRACTITIONER

## 2024-10-30 PROCEDURE — 1160F RVW MEDS BY RX/DR IN RCRD: CPT | Performed by: NURSE PRACTITIONER

## 2024-10-30 PROCEDURE — 1036F TOBACCO NON-USER: CPT | Performed by: NURSE PRACTITIONER

## 2024-10-30 PROCEDURE — 1123F ACP DISCUSS/DSCN MKR DOCD: CPT | Performed by: NURSE PRACTITIONER

## 2024-10-30 ASSESSMENT — ANXIETY QUESTIONNAIRES
1. FEELING NERVOUS, ANXIOUS, OR ON EDGE: SEVERAL DAYS
7. FEELING AFRAID AS IF SOMETHING AWFUL MIGHT HAPPEN: NOT AT ALL
3. WORRYING TOO MUCH ABOUT DIFFERENT THINGS: SEVERAL DAYS
2. NOT BEING ABLE TO STOP OR CONTROL WORRYING: NOT AT ALL
IF YOU CHECKED OFF ANY PROBLEMS ON THIS QUESTIONNAIRE, HOW DIFFICULT HAVE THESE PROBLEMS MADE IT FOR YOU TO DO YOUR WORK, TAKE CARE OF THINGS AT HOME, OR GET ALONG WITH OTHER PEOPLE: NOT DIFFICULT AT ALL
4. TROUBLE RELAXING: SEVERAL DAYS
6. BECOMING EASILY ANNOYED OR IRRITABLE: SEVERAL DAYS
5. BEING SO RESTLESS THAT IT IS HARD TO SIT STILL: NOT AT ALL
GAD7 TOTAL SCORE: 4

## 2024-10-30 NOTE — TELEPHONE ENCOUNTER
Called to inform patient she needs an appointment to be in compliance so she can get her medication. No answer, left vm.

## 2024-10-31 NOTE — TELEPHONE ENCOUNTER
Patient was seen yesterday and is scheduled for another CSV in 90 days. Can we please send refill so I can close this out of my basket?

## 2024-11-04 RX ORDER — ALPRAZOLAM 0.25 MG/1
0.25 TABLET ORAL 2 TIMES DAILY PRN
Qty: 60 TABLET | Refills: 2 | Status: SHIPPED | OUTPATIENT
Start: 2024-11-04

## 2024-11-06 DIAGNOSIS — M54.50 CHRONIC BILATERAL LOW BACK PAIN WITHOUT SCIATICA: ICD-10-CM

## 2024-11-06 DIAGNOSIS — G89.29 CHRONIC BILATERAL LOW BACK PAIN WITHOUT SCIATICA: ICD-10-CM

## 2024-12-02 DIAGNOSIS — I10 HYPERTENSION, ESSENTIAL: ICD-10-CM

## 2024-12-02 RX ORDER — AMLODIPINE BESYLATE 5 MG/1
5 TABLET ORAL DAILY
Qty: 30 TABLET | Refills: 11 | Status: SHIPPED | OUTPATIENT
Start: 2024-12-02 | End: 2025-12-02

## 2024-12-02 NOTE — TELEPHONE ENCOUNTER
I need a refill for  amlodipine by 12/9,  and since you just upped it to 5.0 mg, I will need a new prescription sent in to [email or web address], [address].     Thanks

## 2024-12-10 ENCOUNTER — APPOINTMENT (OUTPATIENT)
Dept: PRIMARY CARE | Facility: CLINIC | Age: 74
End: 2024-12-10
Payer: MEDICARE

## 2025-01-31 ENCOUNTER — APPOINTMENT (OUTPATIENT)
Dept: PRIMARY CARE | Facility: CLINIC | Age: 75
End: 2025-01-31
Payer: MEDICARE

## 2025-01-31 VITALS
HEIGHT: 60 IN | SYSTOLIC BLOOD PRESSURE: 142 MMHG | OXYGEN SATURATION: 96 % | DIASTOLIC BLOOD PRESSURE: 74 MMHG | BODY MASS INDEX: 35.38 KG/M2 | WEIGHT: 180.2 LBS | TEMPERATURE: 97.7 F | HEART RATE: 79 BPM

## 2025-01-31 DIAGNOSIS — Z79.899 MEDICATION MANAGEMENT: ICD-10-CM

## 2025-01-31 DIAGNOSIS — N81.10 FEMALE BLADDER PROLAPSE: ICD-10-CM

## 2025-01-31 DIAGNOSIS — F41.9 ANXIETY: Primary | ICD-10-CM

## 2025-01-31 DIAGNOSIS — E55.9 VITAMIN D DEFICIENCY: ICD-10-CM

## 2025-01-31 DIAGNOSIS — R30.0 DYSURIA: ICD-10-CM

## 2025-01-31 DIAGNOSIS — M19.012 OSTEOARTHRITIS OF BOTH SHOULDERS, UNSPECIFIED OSTEOARTHRITIS TYPE: ICD-10-CM

## 2025-01-31 DIAGNOSIS — M19.011 OSTEOARTHRITIS OF BOTH SHOULDERS, UNSPECIFIED OSTEOARTHRITIS TYPE: ICD-10-CM

## 2025-01-31 DIAGNOSIS — F39 MOOD DISORDER (CMS-HCC): ICD-10-CM

## 2025-01-31 DIAGNOSIS — G62.9 NEUROPATHY: ICD-10-CM

## 2025-01-31 DIAGNOSIS — E78.2 HYPERLIPEMIA, MIXED: ICD-10-CM

## 2025-01-31 DIAGNOSIS — R73.9 ELEVATED BLOOD SUGAR: ICD-10-CM

## 2025-01-31 PROBLEM — R07.9 CHEST PAIN: Status: RESOLVED | Noted: 2024-02-25 | Resolved: 2025-01-31

## 2025-01-31 LAB
POC APPEARANCE, URINE: CLEAR
POC BILIRUBIN, URINE: NEGATIVE
POC BLOOD, URINE: NEGATIVE
POC COLOR, URINE: ABNORMAL
POC GLUCOSE, URINE: NEGATIVE MG/DL
POC KETONES, URINE: NEGATIVE MG/DL
POC LEUKOCYTES, URINE: NEGATIVE
POC NITRITE,URINE: NEGATIVE
POC PH, URINE: 6 PH
POC PROTEIN, URINE: NEGATIVE MG/DL
POC SPECIFIC GRAVITY, URINE: 1.01
POC UROBILINOGEN, URINE: 0.2 EU/DL

## 2025-01-31 PROCEDURE — 1123F ACP DISCUSS/DSCN MKR DOCD: CPT | Performed by: INTERNAL MEDICINE

## 2025-01-31 PROCEDURE — 3008F BODY MASS INDEX DOCD: CPT | Performed by: INTERNAL MEDICINE

## 2025-01-31 PROCEDURE — 3078F DIAST BP <80 MM HG: CPT | Performed by: INTERNAL MEDICINE

## 2025-01-31 PROCEDURE — 1159F MED LIST DOCD IN RCRD: CPT | Performed by: INTERNAL MEDICINE

## 2025-01-31 PROCEDURE — 1036F TOBACCO NON-USER: CPT | Performed by: INTERNAL MEDICINE

## 2025-01-31 PROCEDURE — 3077F SYST BP >= 140 MM HG: CPT | Performed by: INTERNAL MEDICINE

## 2025-01-31 PROCEDURE — G2211 COMPLEX E/M VISIT ADD ON: HCPCS | Performed by: INTERNAL MEDICINE

## 2025-01-31 PROCEDURE — 99214 OFFICE O/P EST MOD 30 MIN: CPT | Performed by: INTERNAL MEDICINE

## 2025-01-31 PROCEDURE — 81002 URINALYSIS NONAUTO W/O SCOPE: CPT | Performed by: INTERNAL MEDICINE

## 2025-01-31 RX ORDER — ALPRAZOLAM 0.25 MG/1
0.25 TABLET ORAL 2 TIMES DAILY PRN
Qty: 60 TABLET | Refills: 2 | Status: SHIPPED | OUTPATIENT
Start: 2025-01-31

## 2025-01-31 RX ORDER — CHLORHEXIDINE GLUCONATE ORAL RINSE 1.2 MG/ML
15 SOLUTION DENTAL AS NEEDED
Qty: 120 ML | Refills: 1 | Status: SHIPPED | OUTPATIENT
Start: 2025-01-31

## 2025-01-31 RX ORDER — SODIUM FLUORIDE1.1%, POTASSIUM NITRATE 5% 5.8; 57.5 MG/ML; MG/ML
GEL, DENTIFRICE DENTAL
COMMUNITY
Start: 2024-10-25

## 2025-01-31 RX ORDER — GABAPENTIN 300 MG/1
300 CAPSULE ORAL 2 TIMES DAILY
Qty: 180 CAPSULE | Refills: 3 | Status: SHIPPED | OUTPATIENT
Start: 2025-01-31 | End: 2026-01-26

## 2025-01-31 ASSESSMENT — ANXIETY QUESTIONNAIRES
2. NOT BEING ABLE TO STOP OR CONTROL WORRYING: SEVERAL DAYS
4. TROUBLE RELAXING: NOT AT ALL
GAD7 TOTAL SCORE: 2
6. BECOMING EASILY ANNOYED OR IRRITABLE: SEVERAL DAYS
1. FEELING NERVOUS, ANXIOUS, OR ON EDGE: NOT AT ALL
7. FEELING AFRAID AS IF SOMETHING AWFUL MIGHT HAPPEN: NOT AT ALL
3. WORRYING TOO MUCH ABOUT DIFFERENT THINGS: NOT AT ALL
5. BEING SO RESTLESS THAT IT IS HARD TO SIT STILL: NOT AT ALL

## 2025-01-31 NOTE — PROGRESS NOTES
Problem List Items Addressed This Visit       Anxiety - Primary    Overview     sx managed - multiple family stressors presently   no AE or SE.  continue current dose xanax prn  Cymbalta also          Relevant Medications    ALPRAZolam (Xanax) 0.25 mg tablet    Other Relevant Orders    Hemoglobin A1C    Comprehensive Metabolic Panel    TSH with reflex to Free T4 if abnormal    Lipid Panel    CBC and Auto Differential    Albumin-Creatinine Ratio, Urine Random    Elevated blood sugar    Overview     10/23 HA1C = 5.7  Continue to monitor with this slight increase from 5.3         Relevant Orders    Hemoglobin A1C    Mood disorder (CMS-[facility])    Overview     Stable condition. Follow up at least yearly. Continue current medications.     follow up at least  yearly.           Neuropathy    Overview       Inc gabapentin to 300 bid prn          Relevant Medications    gabapentin (Neurontin) 300 mg capsule    Other Relevant Orders    Hemoglobin A1C    Comprehensive Metabolic Panel    TSH with reflex to Free T4 if abnormal    Lipid Panel    CBC and Auto Differential    Albumin-Creatinine Ratio, Urine Random    Osteoarthritis    Relevant Orders    Hemoglobin A1C    Comprehensive Metabolic Panel    TSH with reflex to Free T4 if abnormal    Lipid Panel    CBC and Auto Differential    Albumin-Creatinine Ratio, Urine Random    Vitamin D deficiency    Overview     10/23 labs stable  Cont supplement          Relevant Orders    Hemoglobin A1C    Comprehensive Metabolic Panel    TSH with reflex to Free T4 if abnormal    Vitamin D 25-Hydroxy,Total (for eval of Vitamin D levels)    Lipid Panel    CBC and Auto Differential    Albumin-Creatinine Ratio, Urine Random    Medication management    Overview     CSA, UDS, OARRS, CSV [facility] compliant   Q3m           Relevant Medications    chlorhexidine (Peridex) 0.12 % solution    Other Relevant Orders    Opiate/Opioid/Benzo Prescription Compliance    Female bladder prolapse    Relevant Orders    Urine  Culture     Other Visit Diagnoses       Hyperlipemia, mixed        Relevant Orders    Lipid Panel    Dysuria        Relevant Orders    POCT UA (nonautomated) manually resulted (Completed)    Urine Culture         Chronic conditions reviewed in the assessment and plan.    Continue medications unless specified otherwise.  Previous labs reviewed.   Other specialty provider notes reviewed.   Follow up in 3 months or prn.    Meds help a lot  Volunteers on Tuesday  Feet still numb  But pain much better  Had steroid inj in knee  Sees ortho  Shoulder bl oa sees ortho    Controlled Substance Visit:  I have personally reviewed the OARRS report and have considered the risks of abuse, dependence, addiction and diversion and I believe that it is clinically appropriate for the patient to be prescribed this medication.    Is the patient prescribed a combination of a benzodiazepine and opioid?  No    Last Urine Drug Screen / ordered today: Yes  Recent Results (from the past 8760 hours)   Confirmation Opiate/Opioid/Benzo Prescription Compliance    Collection Time: 03/06/24 10:13 AM   Result Value Ref Range    Clonazepam <25 <25 ng/mL    7-Aminoclonazepam <25 <25 ng/mL    Alprazolam 32 (H) <25 ng/mL    Alpha-Hydroxyalprazolam 135 (H) <25 ng/mL    Midazolam <25 <25 ng/mL    Alpha-Hydroxymidazolam <25 <25 ng/mL    Chlordiazepoxide <25 <25 ng/mL    Diazepam <25 <25 ng/mL    Nordiazepam <25 <25 ng/mL    Temazepam <25 <25 ng/mL    Oxazepam <25 <25 ng/mL    Lorazepam <25 <25 ng/mL    Methadone <25 <25 ng/mL    EDDP <25 <25 ng/mL    6-Acetylmorphine <25 <25 ng/mL    Codeine <50 <50 ng/mL    Hydrocodone <25 <25 ng/mL    Hydromorphone <25 <25 ng/mL    Morphine  <50 <50 ng/mL    Norhydrocodone <25 <25 ng/mL    Noroxycodone <25 <25 ng/mL    Oxycodone <25 <25 ng/mL    Oxymorphone <25 <25 ng/mL    Fentanyl <2.5 <2.5 ng/mL    Norfentanyl <2.5 <2.5 ng/mL    Tramadol <50 <50 ng/mL    O-Desmethyltramadol <50 <50 ng/mL    Zolpidem <25 <25 ng/mL     Zolpidem Metabolite (ZCA) <25 <25 ng/mL   Screen Opiate/Opioid/Benzo Prescription Compliance    Collection Time: 24 10:13 AM   Result Value Ref Range    Creatinine, Urine Random 106.8 20.0 - 320.0 mg/dL    Amphetamine Screen, Urine Presumptive Negative Presumptive Negative    Barbiturate Screen, Urine Presumptive Negative Presumptive Negative    Cannabinoid Screen, Urine Presumptive Negative Presumptive Negative    Cocaine Metabolite Screen, Urine Presumptive Negative Presumptive Negative    PCP Screen, Urine Presumptive Negative Presumptive Negative     Results are as expected.     Controlled Substance Agreement:  Date of the Last Agreement: 2025  I have reviewed each line item on the Controlled Substance Agreement including, but not limited to, the benefits, risks, and alternatives to treatment with a Controlled Substance medication(s). The patient has verbalized understanding and signed the agreement.    Benzodiazepines:  What is the patient's goal of therapy? anxiety  Is this being achieved with current treatment? yes    LOC-7:  Over the last 2 weeks, how often have you been bothered by any of the following problems?  Feeling nervous, anxious, or on edge: 0  Not being able to stop or control worryin  Worrying too much about different things: 0  Trouble relaxin  Being so restless that it is hard to sit still: 0  Becoming easily annoyed or irritable: 1  Feeling afraid as if something awful might happen: 0  LOC-7 Total Score: 2        Activities of Daily Living:   Is your overall impression that this patient is benefiting (symptom reduction outweighs side effects) from benzodiazepine therapy? Yes     1. Physical Functioning: Better  2. Family Relationship: Better  3. Social Relationship: Better  4. Mood: Better  5. Sleep Patterns: Better  6. Overall Function: Better    Gen: Alert, NAD  HEENT:  PERRL  conjunctiva and sclera normal in appearance; Neck supple  Respiratory:  Lungs  CTAB  Cardiovascular:  Heart RRR. No M/R/G  Abdomen: soft, nontender, nondistended   Neuro:  Gross motor and sensory intact  Skin:  No suspicious lesions present   Mood: normal      Foot clear nl pulse

## 2025-02-02 LAB
1OH-MIDAZOLAM UR-MCNC: NEGATIVE NG/ML
7AMINOCLONAZEPAM UR-MCNC: NEGATIVE NG/ML
A-OH ALPRAZ UR-MCNC: 42 NG/ML
A-OH-TRIAZOLAM UR-MCNC: NEGATIVE NG/ML
AMPHETAMINES UR QL: NEGATIVE NG/ML
BACTERIA UR CULT: NORMAL
BARBITURATES UR QL: NEGATIVE NG/ML
BZE UR QL: NEGATIVE NG/ML
CODEINE UR-MCNC: NEGATIVE NG/ML
CREAT UR-MCNC: 32.4 MG/DL
DRUG SCREEN COMMENT UR-IMP: ABNORMAL
EDDP UR-MCNC: ABNORMAL NG/ML
FENTANYL UR-MCNC: NEGATIVE NG/ML
HYDROCODONE UR-MCNC: NEGATIVE NG/ML
HYDROMORPHONE UR-MCNC: NEGATIVE NG/ML
LORAZEPAM UR-MCNC: NEGATIVE NG/ML
METHADONE UR-MCNC: ABNORMAL UG/L
MORPHINE UR-MCNC: NEGATIVE NG/ML
NORDIAZEPAM UR-MCNC: NEGATIVE NG/ML
NORFENTANYL UR-MCNC: NEGATIVE NG/ML
NORHYDROCODONE UR CFM-MCNC: NEGATIVE NG/ML
NOROXYCODONE UR CFM-MCNC: NEGATIVE NG/ML
NORTRAMADOL UR-MCNC: NEGATIVE NG/ML
OH-ETHYLFLURAZ UR-MCNC: NEGATIVE NG/ML
OXAZEPAM UR-MCNC: NEGATIVE NG/ML
OXIDANTS UR QL: NEGATIVE MCG/ML
OXYCODONE UR CFM-MCNC: NEGATIVE NG/ML
OXYMORPHONE UR CFM-MCNC: NEGATIVE NG/ML
PCP UR QL: NEGATIVE NG/ML
PH UR: 6.8 [PH] (ref 4.5–9)
QUEST 6 ACETYLMORPHINE: ABNORMAL
QUEST NOTES AND COMMENTS: ABNORMAL
QUEST PATIENT HISTORICAL REPORT: ABNORMAL
QUEST ZOLPIDEM: NEGATIVE NG/ML
TEMAZEPAM UR-MCNC: NEGATIVE NG/ML
THC UR QL: NEGATIVE NG/ML
TRAMADOL UR-MCNC: NEGATIVE NG/ML
ZOLPIDEM PHENYL-4-CARB UR CFM-MCNC: NEGATIVE NG/ML

## 2025-02-03 LAB
1OH-MIDAZOLAM UR-MCNC: NEGATIVE NG/ML
7AMINOCLONAZEPAM UR-MCNC: NEGATIVE NG/ML
A-OH ALPRAZ UR-MCNC: 42 NG/ML
A-OH-TRIAZOLAM UR-MCNC: NEGATIVE NG/ML
AMPHETAMINES UR QL: NEGATIVE NG/ML
BARBITURATES UR QL: NEGATIVE NG/ML
BZE UR QL: NEGATIVE NG/ML
CODEINE UR-MCNC: NEGATIVE NG/ML
CREAT UR-MCNC: 32.4 MG/DL
DRUG SCREEN COMMENT UR-IMP: ABNORMAL
EDDP UR-MCNC: NEGATIVE NG/ML
FENTANYL UR-MCNC: NEGATIVE NG/ML
HYDROCODONE UR-MCNC: NEGATIVE NG/ML
HYDROMORPHONE UR-MCNC: NEGATIVE NG/ML
LORAZEPAM UR-MCNC: NEGATIVE NG/ML
METHADONE UR-MCNC: NEGATIVE NG/ML
MORPHINE UR-MCNC: NEGATIVE NG/ML
NORDIAZEPAM UR-MCNC: NEGATIVE NG/ML
NORFENTANYL UR-MCNC: NEGATIVE NG/ML
NORHYDROCODONE UR CFM-MCNC: NEGATIVE NG/ML
NOROXYCODONE UR CFM-MCNC: NEGATIVE NG/ML
NORTRAMADOL UR-MCNC: NEGATIVE NG/ML
OH-ETHYLFLURAZ UR-MCNC: NEGATIVE NG/ML
OXAZEPAM UR-MCNC: NEGATIVE NG/ML
OXIDANTS UR QL: NEGATIVE MCG/ML
OXYCODONE UR CFM-MCNC: NEGATIVE NG/ML
OXYMORPHONE UR CFM-MCNC: NEGATIVE NG/ML
PCP UR QL: NEGATIVE NG/ML
PH UR: 6.8 [PH] (ref 4.5–9)
QUEST 6 ACETYLMORPHINE: NEGATIVE NG/ML
QUEST NOTES AND COMMENTS: ABNORMAL
QUEST ZOLPIDEM: NEGATIVE NG/ML
TEMAZEPAM UR-MCNC: NEGATIVE NG/ML
THC UR QL: NEGATIVE NG/ML
TRAMADOL UR-MCNC: NEGATIVE NG/ML
ZOLPIDEM PHENYL-4-CARB UR CFM-MCNC: NEGATIVE NG/ML

## 2025-03-11 DIAGNOSIS — Z00.00 WELL ADULT EXAM: Primary | ICD-10-CM

## 2025-03-11 DIAGNOSIS — J30.9 ALLERGIC RHINITIS, UNSPECIFIED SEASONALITY, UNSPECIFIED TRIGGER: ICD-10-CM

## 2025-03-11 DIAGNOSIS — E78.2 HYPERLIPEMIA, MIXED: ICD-10-CM

## 2025-03-11 DIAGNOSIS — E55.9 VITAMIN D DEFICIENCY: ICD-10-CM

## 2025-03-11 DIAGNOSIS — R73.9 ELEVATED BLOOD SUGAR: ICD-10-CM

## 2025-03-11 RX ORDER — FLUTICASONE PROPIONATE 50 MCG
2 SPRAY, SUSPENSION (ML) NASAL DAILY
Qty: 48 G | Refills: 3 | Status: SHIPPED | OUTPATIENT
Start: 2025-03-11 | End: 2026-03-11

## 2025-03-11 NOTE — TELEPHONE ENCOUNTER
"Refill Request      Last OV with PCP 1/31/2025     Future Appointments       Date / Time Provider Department Dept Phone    5/2/2025 1:30 PM (Arrive by 1:15 PM) Luz Maria Allen MD [facility] [phone]          Lab Results   Component Value Date    HGBA1C 5.7 (H) 09/03/2024     Lab Results   Component Value Date    CHOL 178 03/06/2024    CHOL 184 02/17/2021    CHOL 195 08/29/2018     Lab Results   Component Value Date    HDL 54.0 03/06/2024    HDL 59.0 02/17/2021    HDL 61.0 08/29/2018     Lab Results   Component Value Date    LDLCALC 95 03/06/2024     Lab Results   Component Value Date    TRIG 145 03/06/2024    TRIG 106 02/17/2021    TRIG 117 08/29/2018     No components found for: \"CHOLHDL\"  Lab Results   Component Value Date    TSH 1.91 03/06/2024     Lab Results   Component Value Date    GLUCOSE 79 04/05/2024    CALCIUM 9.7 04/05/2024     04/05/2024    K 3.9 04/05/2024    CO2 29 04/05/2024     04/05/2024    BUN 19 04/05/2024    CREATININE 0.62 04/05/2024       "

## 2025-03-11 NOTE — PROGRESS NOTES
Patient called in stating unable to get lab wok done at [facility] due to orders stating collection for '[facility]'   Reordered lab work with 'external lab'     Faxed orders to [facility] [phone] on 3/11/25

## 2025-03-29 DIAGNOSIS — I32 PERICARDITIS IN DISEASES CLASSIFIED ELSEWHERE (HHS-HCC): ICD-10-CM

## 2025-03-29 DIAGNOSIS — F41.9 ANXIETY: ICD-10-CM

## 2025-03-29 DIAGNOSIS — Z79.899 MEDICATION MANAGEMENT: ICD-10-CM

## 2025-03-31 RX ORDER — HYDROCHLOROTHIAZIDE 25 MG/1
25 TABLET ORAL DAILY
Qty: 90 TABLET | Refills: 3 | Status: SHIPPED | OUTPATIENT
Start: 2025-03-31

## 2025-03-31 NOTE — TELEPHONE ENCOUNTER
"Refill Request      Last OV with PCP 1/31/2025     Future Appointments       Date / Time Provider Department Dept Phone    5/2/2025 1:30 PM (Arrive by 1:15 PM) Luz Maria Allen MD [facility] [phone]          Lab Results   Component Value Date    HGBA1C 5.6 03/19/2025     Lab Results   Component Value Date    CHOL 178 03/06/2024    CHOL 184 02/17/2021    CHOL 195 08/29/2018     Lab Results   Component Value Date    HDL 54.0 03/06/2024    HDL 59.0 02/17/2021    HDL 61.0 08/29/2018     Lab Results   Component Value Date    LDLCALC 95 03/06/2024     Lab Results   Component Value Date    TRIG 145 03/06/2024    TRIG 106 02/17/2021    TRIG 117 08/29/2018     No components found for: \"CHOLHDL\"  Lab Results   Component Value Date    TSH 1.91 03/06/2024     Lab Results   Component Value Date    GLUCOSE 79 04/05/2024    CALCIUM 9.7 04/05/2024     04/05/2024    K 3.9 04/05/2024    CO2 29 04/05/2024     04/05/2024    BUN 19 04/05/2024    CREATININE 0.62 04/05/2024      "

## 2025-04-02 ENCOUNTER — OFFICE (OUTPATIENT)
Dept: URBAN - METROPOLITAN AREA CLINIC 26 | Facility: CLINIC | Age: 75
End: 2025-04-02
Payer: MEDICARE

## 2025-04-02 VITALS
HEART RATE: 88 BPM | TEMPERATURE: 97.3 F | WEIGHT: 178 LBS | SYSTOLIC BLOOD PRESSURE: 157 MMHG | HEIGHT: 62 IN | DIASTOLIC BLOOD PRESSURE: 77 MMHG

## 2025-04-02 DIAGNOSIS — K29.00 ACUTE GASTRITIS WITHOUT BLEEDING: ICD-10-CM

## 2025-04-02 DIAGNOSIS — K21.9 GASTRO-ESOPHAGEAL REFLUX DISEASE WITHOUT ESOPHAGITIS: ICD-10-CM

## 2025-04-02 DIAGNOSIS — R10.12 LEFT UPPER QUADRANT PAIN: ICD-10-CM

## 2025-04-02 DIAGNOSIS — Z86.0100 PERSONAL HISTORY OF COLON POLYPS, UNSPECIFIED: ICD-10-CM

## 2025-04-02 DIAGNOSIS — I10 ESSENTIAL (PRIMARY) HYPERTENSION: ICD-10-CM

## 2025-04-02 DIAGNOSIS — K59.00 CONSTIPATION, UNSPECIFIED: ICD-10-CM

## 2025-04-02 PROCEDURE — 99213 OFFICE O/P EST LOW 20 MIN: CPT | Performed by: NURSE PRACTITIONER

## 2025-05-02 ENCOUNTER — APPOINTMENT (OUTPATIENT)
Dept: PRIMARY CARE | Facility: CLINIC | Age: 75
End: 2025-05-02
Payer: MEDICARE

## 2025-05-02 VITALS
TEMPERATURE: 97.8 F | WEIGHT: 178 LBS | HEIGHT: 61 IN | HEART RATE: 76 BPM | BODY MASS INDEX: 33.61 KG/M2 | SYSTOLIC BLOOD PRESSURE: 129 MMHG | DIASTOLIC BLOOD PRESSURE: 75 MMHG | OXYGEN SATURATION: 98 %

## 2025-05-02 DIAGNOSIS — E78.2 MIXED HYPERLIPIDEMIA: ICD-10-CM

## 2025-05-02 DIAGNOSIS — F41.9 ANXIETY: ICD-10-CM

## 2025-05-02 DIAGNOSIS — E55.9 VITAMIN D DEFICIENCY: ICD-10-CM

## 2025-05-02 DIAGNOSIS — I10 HYPERTENSION, ESSENTIAL: Primary | ICD-10-CM

## 2025-05-02 DIAGNOSIS — N81.10 FEMALE BLADDER PROLAPSE: ICD-10-CM

## 2025-05-02 DIAGNOSIS — R73.9 ELEVATED BLOOD SUGAR: ICD-10-CM

## 2025-05-02 DIAGNOSIS — Z12.31 SCREENING MAMMOGRAM FOR BREAST CANCER: ICD-10-CM

## 2025-05-02 PROCEDURE — 1123F ACP DISCUSS/DSCN MKR DOCD: CPT | Performed by: INTERNAL MEDICINE

## 2025-05-02 PROCEDURE — 99214 OFFICE O/P EST MOD 30 MIN: CPT | Performed by: INTERNAL MEDICINE

## 2025-05-02 PROCEDURE — 3078F DIAST BP <80 MM HG: CPT | Performed by: INTERNAL MEDICINE

## 2025-05-02 PROCEDURE — 1159F MED LIST DOCD IN RCRD: CPT | Performed by: INTERNAL MEDICINE

## 2025-05-02 PROCEDURE — G2211 COMPLEX E/M VISIT ADD ON: HCPCS | Performed by: INTERNAL MEDICINE

## 2025-05-02 PROCEDURE — 1036F TOBACCO NON-USER: CPT | Performed by: INTERNAL MEDICINE

## 2025-05-02 PROCEDURE — 3074F SYST BP LT 130 MM HG: CPT | Performed by: INTERNAL MEDICINE

## 2025-05-02 PROCEDURE — 3008F BODY MASS INDEX DOCD: CPT | Performed by: INTERNAL MEDICINE

## 2025-05-02 RX ORDER — LOSARTAN POTASSIUM 100 MG/1
100 TABLET ORAL DAILY
Qty: 90 TABLET | Refills: 3 | Status: SHIPPED | OUTPATIENT
Start: 2025-05-02 | End: 2026-05-02

## 2025-05-02 RX ORDER — PRAVASTATIN SODIUM 20 MG/1
20 TABLET ORAL NIGHTLY
Qty: 90 TABLET | Refills: 3 | Status: SHIPPED | OUTPATIENT
Start: 2025-05-02 | End: 2026-05-02

## 2025-05-02 RX ORDER — CHOLECALCIFEROL (VITAMIN D3) 50 MCG
100 TABLET ORAL DAILY
COMMUNITY

## 2025-05-02 RX ORDER — AMLODIPINE BESYLATE 5 MG/1
5 TABLET ORAL DAILY
Qty: 90 TABLET | Refills: 3 | Status: SHIPPED | OUTPATIENT
Start: 2025-05-02 | End: 2026-05-02

## 2025-05-02 ASSESSMENT — ANXIETY QUESTIONNAIRES
3. WORRYING TOO MUCH ABOUT DIFFERENT THINGS: SEVERAL DAYS
4. TROUBLE RELAXING: NOT AT ALL
6. BECOMING EASILY ANNOYED OR IRRITABLE: SEVERAL DAYS
GAD7 TOTAL SCORE: 4
2. NOT BEING ABLE TO STOP OR CONTROL WORRYING: SEVERAL DAYS
5. BEING SO RESTLESS THAT IT IS HARD TO SIT STILL: NOT AT ALL
7. FEELING AFRAID AS IF SOMETHING AWFUL MIGHT HAPPEN: NOT AT ALL
1. FEELING NERVOUS, ANXIOUS, OR ON EDGE: SEVERAL DAYS
IF YOU CHECKED OFF ANY PROBLEMS ON THIS QUESTIONNAIRE, HOW DIFFICULT HAVE THESE PROBLEMS MADE IT FOR YOU TO DO YOUR WORK, TAKE CARE OF THINGS AT HOME, OR GET ALONG WITH OTHER PEOPLE: SOMEWHAT DIFFICULT

## 2025-05-02 ASSESSMENT — PATIENT HEALTH QUESTIONNAIRE - PHQ9
10. IF YOU CHECKED OFF ANY PROBLEMS, HOW DIFFICULT HAVE THESE PROBLEMS MADE IT FOR YOU TO DO YOUR WORK, TAKE CARE OF THINGS AT HOME, OR GET ALONG WITH OTHER PEOPLE: NOT DIFFICULT AT ALL
2. FEELING DOWN, DEPRESSED OR HOPELESS: SEVERAL DAYS
1. LITTLE INTEREST OR PLEASURE IN DOING THINGS: SEVERAL DAYS
SUM OF ALL RESPONSES TO PHQ9 QUESTIONS 1 AND 2: 2

## 2025-05-02 NOTE — PROGRESS NOTES
"Subjective   Patient ID: Aileen Velasco is a 74 y.o. female who presents for CSV.    HPI  Will have egd and colonoscopy next week     Taking prilosec once per day   Then pepcid before bed  Mr  of knee next week  No pt for 3 years  Weak on left  R knee pain  In [employer] now  Sister illness stressful  Will do counseling  Volunteers at [employer]  Needs to have prolapse bladder addressed    Gen:  no fever  HEENT:  no trouble swallowing  CV:  no dyspnea, cyanosis  Lungs:  no shortness of breath  GI:  no constipation, no blood in stool  Vascular:  no edema  Neuro:   no weakness  Skin:  no rash  MS:no joint swelling pos r knee pain    Gu:  no urinary complaints  All other systems have been reviewed and are negative for complaint      /75   Pulse 76   Temp 36.6 °C (97.8 °F) (Temporal)   Ht 1.549 m (5' 1\")   Wt 80.7 kg (178 lb)   SpO2 98%   BMI 33.63 kg/m²   Objective   Gen:  alert, nad  Eyes: per sclera white   Neck: supple no tm, no lad  Lungs:  CTAB  Heart:  RRR no murmur   Neuro: nonfocal  Le:  no edema has brace on r          Assessment/Plan    Problem List Items Addressed This Visit       Anxiety    Overview   sx managed - multiple family stressors presently   no AE or SE.  continue current dose xanax prn  Cymbalta also          Relevant Orders    Hemoglobin A1C    Comprehensive Metabolic Panel    Lipid Panel    CBC and Auto Differential    TSH with reflex to Free T4 if abnormal    Vitamin B12    Albumin-Creatinine Ratio, Urine Random    Urinalysis with Reflex Microscopic    Elevated blood sugar    Overview   10/23 HA1C = 5.7  Continue to monitor with this slight increase from 5.3         Relevant Orders    Hemoglobin A1C    Hypertension, essential - Primary    Overview   2/17/21: Continue current medications.         Relevant Medications    amLODIPine (Norvasc) 5 mg tablet    losartan (Cozaar) 100 mg tablet    Other Relevant Orders    Hemoglobin A1C    Comprehensive Metabolic Panel    Lipid Panel    CBC " and Auto Differential    TSH with reflex to Free T4 if abnormal    Vitamin B12    Albumin-Creatinine Ratio, Urine Random    Urinalysis with Reflex Microscopic    Mixed hyperlipidemia    Relevant Medications    pravastatin (Pravachol) 20 mg tablet    Other Relevant Orders    Hemoglobin A1C    Comprehensive Metabolic Panel    Lipid Panel    CBC and Auto Differential    TSH with reflex to Free T4 if abnormal    Vitamin B12    Albumin-Creatinine Ratio, Urine Random    Urinalysis with Reflex Microscopic    Vitamin D deficiency    Overview   10/23 labs stable  Cont supplement          Relevant Orders    Hemoglobin A1C    Comprehensive Metabolic Panel    Lipid Panel    CBC and Auto Differential    TSH with reflex to Free T4 if abnormal    Vitamin B12    Vitamin D 25-Hydroxy,Total (for eval of Vitamin D levels)    Albumin-Creatinine Ratio, Urine Random    Urinalysis with Reflex Microscopic    Female bladder prolapse    Relevant Orders    Referral to Urogynecology    Hemoglobin A1C    Comprehensive Metabolic Panel    Lipid Panel    CBC and Auto Differential    TSH with reflex to Free T4 if abnormal    Vitamin B12    Albumin-Creatinine Ratio, Urine Random    Urinalysis with Reflex Microscopic     Other Visit Diagnoses         Screening mammogram for breast cancer        Relevant Orders    BI mammo bilateral screening tomosynthesis             [facility]:  Luz Maria Allen MD on 5/2/2025  2:03 PM  I have personally reviewed the [facility] report for Aileen Velasco. I have considered the risks of abuse, dependence, addiction and diversion    Is the patient prescribed a combination of a benzodiazepine and opioid?  No    Last Urine Drug Screen / ordered today: No  Recent Results (from the past 8760 hours)   Opiate/Opioid/Benzo Prescription Compliance    Collection Time: 01/31/25  1:45 PM   Result Value Ref Range    Creatinine 32.4 > or = 20.0 mg/dL    pH 6.8 4.5 - 9.0    Oxidant NEGATIVE <200 mcg/mL    Amphetamines NEGATIVE <500  ng/mL    Barbiturates NEGATIVE <300 ng/mL    Cocaine Metabolite NEGATIVE <150 ng/mL    Marijuana Metabolite NEGATIVE <20 ng/mL    Phencyclidine NEGATIVE <25 ng/mL    Alphahydroxyalprazolam 42 (H) <25 ng/mL    Alphahydroxymidazolam NEGATIVE <50 ng/mL    Alphahydroxytriazolam NEGATIVE <50 ng/mL    Aminoclonazepam NEGATIVE <25 ng/mL    Hydroxyethylflurazepam NEGATIVE <50 ng/mL    Lorazepam NEGATIVE <50 ng/mL    Nordiazepam NEGATIVE <50 ng/mL    Oxazepam NEGATIVE <50 ng/mL    Temazepam NEGATIVE <50 ng/mL    Benzodiazepines Comments      Fentanyl NEGATIVE <0.5 ng/mL    Norfentanyl NEGATIVE <0.5 ng/mL    Fentanyl Comments      6 Acetylmorphine NEGATIVE <10 ng/mL    Heroin Metab Comments      EDDP NEGATIVE <100 ng/mL    Methadone NEGATIVE <100 ng/mL    Methadone Comments      Codeine NEGATIVE <50 ng/mL    Hydrocodone NEGATIVE <50 ng/mL    Hydromorphone NEGATIVE <50 ng/mL    Morphine NEGATIVE <50 ng/mL    Norhydrocodone NEGATIVE <50 ng/mL    Opiates Comments      Noroxycodone NEGATIVE <50 ng/mL    Oxycodone NEGATIVE <50 ng/mL    Oxymorphone NEGATIVE <50 ng/mL    Oxycodone Comments      Desmethyltramadol NEGATIVE <100 ng/mL    Tramadol NEGATIVE <100 ng/mL    Tramadol Comments      Zolpidem NEGATIVE <5 ng/mL    Zolpidem Metabolite NEGATIVE <5 ng/mL    Zolpidem Comments      Notes and Comments       Results are as expected.     Controlled Substance Agreement:  Date of the Last Agreement: 25  Reviewed Controlled Substance Agreement including but not limited to the benefits, risks, and alternatives to treatment with a Controlled Substance medication(s).    Benzodiazepines:  What is the patient's goal of therapy? Anxiety   Is this being achieved with current treatment? Yes     LOC-7:  Over the last 2 weeks, how often have you been bothered by any of the following problems?  Feeling nervous, anxious, or on edge: 1  Not being able to stop or control worryin  Worrying too much about different things: 1  Trouble relaxing:  0  Being so restless that it is hard to sit still: 0  Becoming easily annoyed or irritable: 1  Feeling afraid as if something awful might happen: 0  LOC-7 Total Score: 4        Activities of Daily Living:   Is your overall impression that this patient is benefiting (symptom reduction outweighs side effects) from benzodiazepine therapy? Yes     1. Physical Functioning: Better  2. Family Relationship: Better  3. Social Relationship: Better  4. Mood: Better  5. Sleep Patterns: Same  6. Overall Function: Better      Problem List Items Addressed This Visit       Anxiety    Overview   sx managed - multiple family stressors presently   no AE or SE.  continue current dose xanax prn  Cymbalta also          Relevant Orders    Hemoglobin A1C    Comprehensive Metabolic Panel    Lipid Panel    CBC and Auto Differential    TSH with reflex to Free T4 if abnormal    Vitamin B12    Albumin-Creatinine Ratio, Urine Random    Urinalysis with Reflex Microscopic    Elevated blood sugar    Overview   10/23 HA1C = 5.7  Continue to monitor with this slight increase from 5.3         Relevant Orders    Hemoglobin A1C    Hypertension, essential - Primary    Overview   2/17/21: Continue current medications.         Relevant Medications    amLODIPine (Norvasc) 5 mg tablet    losartan (Cozaar) 100 mg tablet    Other Relevant Orders    Hemoglobin A1C    Comprehensive Metabolic Panel    Lipid Panel    CBC and Auto Differential    TSH with reflex to Free T4 if abnormal    Vitamin B12    Albumin-Creatinine Ratio, Urine Random    Urinalysis with Reflex Microscopic    Mixed hyperlipidemia    Relevant Medications    pravastatin (Pravachol) 20 mg tablet    Other Relevant Orders    Hemoglobin A1C    Comprehensive Metabolic Panel    Lipid Panel    CBC and Auto Differential    TSH with reflex to Free T4 if abnormal    Vitamin B12    Albumin-Creatinine Ratio, Urine Random    Urinalysis with Reflex Microscopic    Vitamin D deficiency    Overview   10/23 labs  stable  Cont supplement          Relevant Orders    Hemoglobin A1C    Comprehensive Metabolic Panel    Lipid Panel    CBC and Auto Differential    TSH with reflex to Free T4 if abnormal    Vitamin B12    Vitamin D 25-Hydroxy,Total (for eval of Vitamin D levels)    Albumin-Creatinine Ratio, Urine Random    Urinalysis with Reflex Microscopic    Female bladder prolapse    Relevant Orders    Referral to Urogynecology    Hemoglobin A1C    Comprehensive Metabolic Panel    Lipid Panel    CBC and Auto Differential    TSH with reflex to Free T4 if abnormal    Vitamin B12    Albumin-Creatinine Ratio, Urine Random    Urinalysis with Reflex Microscopic     Other Visit Diagnoses         Screening mammogram for breast cancer        Relevant Orders    BI mammo bilateral screening tomosynthesis        Chronic conditions reviewed in the assessment and plan.    Continue medications unless specified otherwise.  Previous labs reviewed. 3/2025  Other specialty provider notes reviewed.   Follow up in 3 months or prn.

## 2025-05-20 ENCOUNTER — OFFICE (OUTPATIENT)
Dept: URBAN - METROPOLITAN AREA PATHOLOGY 2 | Facility: PATHOLOGY | Age: 75
End: 2025-05-20
Payer: MEDICARE

## 2025-05-20 ENCOUNTER — AMBULATORY SURGICAL CENTER (OUTPATIENT)
Dept: URBAN - METROPOLITAN AREA SURGERY 12 | Facility: SURGERY | Age: 75
End: 2025-05-20
Payer: MEDICARE

## 2025-05-20 VITALS
SYSTOLIC BLOOD PRESSURE: 121 MMHG | DIASTOLIC BLOOD PRESSURE: 50 MMHG | HEART RATE: 76 BPM | SYSTOLIC BLOOD PRESSURE: 147 MMHG | RESPIRATION RATE: 12 BRPM | SYSTOLIC BLOOD PRESSURE: 121 MMHG | DIASTOLIC BLOOD PRESSURE: 55 MMHG | OXYGEN SATURATION: 98 % | SYSTOLIC BLOOD PRESSURE: 154 MMHG | DIASTOLIC BLOOD PRESSURE: 53 MMHG | HEART RATE: 73 BPM | DIASTOLIC BLOOD PRESSURE: 51 MMHG | DIASTOLIC BLOOD PRESSURE: 70 MMHG | DIASTOLIC BLOOD PRESSURE: 74 MMHG | HEART RATE: 66 BPM | HEART RATE: 71 BPM | SYSTOLIC BLOOD PRESSURE: 155 MMHG | OXYGEN SATURATION: 100 % | SYSTOLIC BLOOD PRESSURE: 138 MMHG | SYSTOLIC BLOOD PRESSURE: 159 MMHG | RESPIRATION RATE: 16 BRPM | RESPIRATION RATE: 11 BRPM | HEART RATE: 63 BPM | HEART RATE: 78 BPM | DIASTOLIC BLOOD PRESSURE: 47 MMHG | HEART RATE: 78 BPM | HEART RATE: 69 BPM | SYSTOLIC BLOOD PRESSURE: 124 MMHG | SYSTOLIC BLOOD PRESSURE: 127 MMHG | OXYGEN SATURATION: 99 % | HEIGHT: 62 IN | DIASTOLIC BLOOD PRESSURE: 59 MMHG | SYSTOLIC BLOOD PRESSURE: 155 MMHG | SYSTOLIC BLOOD PRESSURE: 159 MMHG | SYSTOLIC BLOOD PRESSURE: 148 MMHG | RESPIRATION RATE: 18 BRPM | HEART RATE: 66 BPM | DIASTOLIC BLOOD PRESSURE: 44 MMHG | RESPIRATION RATE: 9 BRPM | DIASTOLIC BLOOD PRESSURE: 48 MMHG | SYSTOLIC BLOOD PRESSURE: 154 MMHG | SYSTOLIC BLOOD PRESSURE: 124 MMHG | RESPIRATION RATE: 12 BRPM | SYSTOLIC BLOOD PRESSURE: 138 MMHG | SYSTOLIC BLOOD PRESSURE: 147 MMHG | DIASTOLIC BLOOD PRESSURE: 74 MMHG | SYSTOLIC BLOOD PRESSURE: 132 MMHG | HEART RATE: 57 BPM | DIASTOLIC BLOOD PRESSURE: 47 MMHG | DIASTOLIC BLOOD PRESSURE: 52 MMHG | DIASTOLIC BLOOD PRESSURE: 48 MMHG | WEIGHT: 178 LBS | DIASTOLIC BLOOD PRESSURE: 44 MMHG | OXYGEN SATURATION: 95 % | SYSTOLIC BLOOD PRESSURE: 150 MMHG | HEART RATE: 59 BPM | SYSTOLIC BLOOD PRESSURE: 151 MMHG | DIASTOLIC BLOOD PRESSURE: 82 MMHG | SYSTOLIC BLOOD PRESSURE: 130 MMHG | HEART RATE: 71 BPM | HEART RATE: 71 BPM | RESPIRATION RATE: 12 BRPM | OXYGEN SATURATION: 99 % | SYSTOLIC BLOOD PRESSURE: 130 MMHG | OXYGEN SATURATION: 96 % | SYSTOLIC BLOOD PRESSURE: 155 MMHG | DIASTOLIC BLOOD PRESSURE: 82 MMHG | OXYGEN SATURATION: 98 % | HEART RATE: 59 BPM | HEART RATE: 68 BPM | DIASTOLIC BLOOD PRESSURE: 59 MMHG | SYSTOLIC BLOOD PRESSURE: 117 MMHG | DIASTOLIC BLOOD PRESSURE: 74 MMHG | SYSTOLIC BLOOD PRESSURE: 151 MMHG | OXYGEN SATURATION: 100 % | HEART RATE: 57 BPM | HEART RATE: 59 BPM | WEIGHT: 178 LBS | OXYGEN SATURATION: 97 % | HEART RATE: 73 BPM | OXYGEN SATURATION: 96 % | RESPIRATION RATE: 13 BRPM | RESPIRATION RATE: 13 BRPM | DIASTOLIC BLOOD PRESSURE: 52 MMHG | RESPIRATION RATE: 9 BRPM | HEART RATE: 76 BPM | HEART RATE: 81 BPM | SYSTOLIC BLOOD PRESSURE: 125 MMHG | SYSTOLIC BLOOD PRESSURE: 124 MMHG | SYSTOLIC BLOOD PRESSURE: 150 MMHG | SYSTOLIC BLOOD PRESSURE: 127 MMHG | SYSTOLIC BLOOD PRESSURE: 154 MMHG | SYSTOLIC BLOOD PRESSURE: 147 MMHG | OXYGEN SATURATION: 98 % | DIASTOLIC BLOOD PRESSURE: 70 MMHG | RESPIRATION RATE: 14 BRPM | SYSTOLIC BLOOD PRESSURE: 146 MMHG | HEART RATE: 66 BPM | HEART RATE: 69 BPM | DIASTOLIC BLOOD PRESSURE: 51 MMHG | RESPIRATION RATE: 16 BRPM | SYSTOLIC BLOOD PRESSURE: 148 MMHG | HEART RATE: 78 BPM | OXYGEN SATURATION: 96 % | HEART RATE: 73 BPM | SYSTOLIC BLOOD PRESSURE: 132 MMHG | SYSTOLIC BLOOD PRESSURE: 132 MMHG | SYSTOLIC BLOOD PRESSURE: 150 MMHG | SYSTOLIC BLOOD PRESSURE: 159 MMHG | DIASTOLIC BLOOD PRESSURE: 51 MMHG | SYSTOLIC BLOOD PRESSURE: 148 MMHG | DIASTOLIC BLOOD PRESSURE: 59 MMHG | SYSTOLIC BLOOD PRESSURE: 125 MMHG | OXYGEN SATURATION: 95 % | DIASTOLIC BLOOD PRESSURE: 53 MMHG | HEART RATE: 81 BPM | HEART RATE: 84 BPM | DIASTOLIC BLOOD PRESSURE: 62 MMHG | HEART RATE: 81 BPM | HEART RATE: 69 BPM | RESPIRATION RATE: 9 BRPM | OXYGEN SATURATION: 97 % | SYSTOLIC BLOOD PRESSURE: 127 MMHG | HEART RATE: 68 BPM | SYSTOLIC BLOOD PRESSURE: 125 MMHG | OXYGEN SATURATION: 100 % | DIASTOLIC BLOOD PRESSURE: 48 MMHG | SYSTOLIC BLOOD PRESSURE: 129 MMHG | DIASTOLIC BLOOD PRESSURE: 50 MMHG | SYSTOLIC BLOOD PRESSURE: 117 MMHG | OXYGEN SATURATION: 97 % | HEART RATE: 63 BPM | RESPIRATION RATE: 18 BRPM | RESPIRATION RATE: 14 BRPM | DIASTOLIC BLOOD PRESSURE: 62 MMHG | WEIGHT: 178 LBS | DIASTOLIC BLOOD PRESSURE: 52 MMHG | DIASTOLIC BLOOD PRESSURE: 47 MMHG | DIASTOLIC BLOOD PRESSURE: 82 MMHG | DIASTOLIC BLOOD PRESSURE: 44 MMHG | SYSTOLIC BLOOD PRESSURE: 146 MMHG | OXYGEN SATURATION: 95 % | SYSTOLIC BLOOD PRESSURE: 137 MMHG | SYSTOLIC BLOOD PRESSURE: 146 MMHG | SYSTOLIC BLOOD PRESSURE: 138 MMHG | HEIGHT: 62 IN | HEART RATE: 84 BPM | SYSTOLIC BLOOD PRESSURE: 137 MMHG | RESPIRATION RATE: 11 BRPM | DIASTOLIC BLOOD PRESSURE: 55 MMHG | OXYGEN SATURATION: 99 % | HEART RATE: 68 BPM | HEIGHT: 62 IN | SYSTOLIC BLOOD PRESSURE: 137 MMHG | SYSTOLIC BLOOD PRESSURE: 117 MMHG | HEART RATE: 76 BPM | SYSTOLIC BLOOD PRESSURE: 129 MMHG | RESPIRATION RATE: 13 BRPM | DIASTOLIC BLOOD PRESSURE: 62 MMHG | DIASTOLIC BLOOD PRESSURE: 55 MMHG | DIASTOLIC BLOOD PRESSURE: 70 MMHG | HEART RATE: 84 BPM | SYSTOLIC BLOOD PRESSURE: 151 MMHG | DIASTOLIC BLOOD PRESSURE: 53 MMHG | RESPIRATION RATE: 18 BRPM | SYSTOLIC BLOOD PRESSURE: 129 MMHG | RESPIRATION RATE: 16 BRPM | SYSTOLIC BLOOD PRESSURE: 130 MMHG | RESPIRATION RATE: 11 BRPM | SYSTOLIC BLOOD PRESSURE: 121 MMHG | DIASTOLIC BLOOD PRESSURE: 50 MMHG | HEART RATE: 63 BPM | HEART RATE: 57 BPM | RESPIRATION RATE: 14 BRPM

## 2025-05-20 DIAGNOSIS — K31.7 POLYP OF STOMACH AND DUODENUM: ICD-10-CM

## 2025-05-20 DIAGNOSIS — K21.9 GASTRO-ESOPHAGEAL REFLUX DISEASE WITHOUT ESOPHAGITIS: ICD-10-CM

## 2025-05-20 DIAGNOSIS — Z86.0101 PERSONAL HISTORY OF ADENOMATOUS AND SERRATED COLON POLYPS: ICD-10-CM

## 2025-05-20 DIAGNOSIS — K62.1 RECTAL POLYP: ICD-10-CM

## 2025-05-20 DIAGNOSIS — D12.2 BENIGN NEOPLASM OF ASCENDING COLON: ICD-10-CM

## 2025-05-20 DIAGNOSIS — K22.89 OTHER SPECIFIED DISEASE OF ESOPHAGUS: ICD-10-CM

## 2025-05-20 DIAGNOSIS — K63.5 POLYP OF COLON: ICD-10-CM

## 2025-05-20 DIAGNOSIS — K44.9 DIAPHRAGMATIC HERNIA WITHOUT OBSTRUCTION OR GANGRENE: ICD-10-CM

## 2025-05-20 DIAGNOSIS — K29.70 GASTRITIS, UNSPECIFIED, WITHOUT BLEEDING: ICD-10-CM

## 2025-05-20 DIAGNOSIS — Z09 ENCOUNTER FOR FOLLOW-UP EXAMINATION AFTER COMPLETED TREATMEN: ICD-10-CM

## 2025-05-20 DIAGNOSIS — K31.89 OTHER DISEASES OF STOMACH AND DUODENUM: ICD-10-CM

## 2025-05-20 DIAGNOSIS — K64.8 OTHER HEMORRHOIDS: ICD-10-CM

## 2025-05-20 PROCEDURE — 45385 COLONOSCOPY W/LESION REMOVAL: CPT | Performed by: INTERNAL MEDICINE

## 2025-05-20 PROCEDURE — 88313 SPECIAL STAINS GROUP 2: CPT | Performed by: PATHOLOGY

## 2025-05-20 PROCEDURE — 43239 EGD BIOPSY SINGLE/MULTIPLE: CPT | Mod: 59 | Performed by: INTERNAL MEDICINE

## 2025-05-20 PROCEDURE — 45380 COLONOSCOPY AND BIOPSY: CPT | Mod: 59 | Performed by: INTERNAL MEDICINE

## 2025-05-20 PROCEDURE — 43251 EGD REMOVE LESION SNARE: CPT | Performed by: INTERNAL MEDICINE

## 2025-05-20 PROCEDURE — 43251 EGD REMOVE LESION SNARE: CPT | Mod: 51 | Performed by: INTERNAL MEDICINE

## 2025-05-20 PROCEDURE — 88305 TISSUE EXAM BY PATHOLOGIST: CPT | Performed by: PATHOLOGY

## 2025-05-20 PROCEDURE — 88342 IMHCHEM/IMCYTCHM 1ST ANTB: CPT | Performed by: PATHOLOGY

## 2025-09-22 ENCOUNTER — APPOINTMENT (OUTPATIENT)
Dept: PRIMARY CARE | Facility: CLINIC | Age: 75
End: 2025-09-22
Payer: MEDICARE